# Patient Record
Sex: FEMALE | Race: BLACK OR AFRICAN AMERICAN | NOT HISPANIC OR LATINO | Employment: FULL TIME | RURAL
[De-identification: names, ages, dates, MRNs, and addresses within clinical notes are randomized per-mention and may not be internally consistent; named-entity substitution may affect disease eponyms.]

---

## 2018-05-17 ENCOUNTER — HISTORICAL (OUTPATIENT)
Dept: ADMINISTRATIVE | Facility: HOSPITAL | Age: 68
End: 2018-05-17

## 2018-05-21 LAB
LAB AP CLINICAL INFORMATION: NORMAL
LAB AP DIAGNOSIS - HISTORICAL: NORMAL
LAB AP GROSS PATHOLOGY - HISTORICAL: NORMAL
LAB AP SPECIMEN SUBMITTED - HISTORICAL: NORMAL

## 2018-10-04 ENCOUNTER — HISTORICAL (OUTPATIENT)
Dept: ADMINISTRATIVE | Facility: HOSPITAL | Age: 68
End: 2018-10-04

## 2020-07-05 ENCOUNTER — HISTORICAL (OUTPATIENT)
Dept: ADMINISTRATIVE | Facility: HOSPITAL | Age: 70
End: 2020-07-05

## 2020-07-05 LAB
ALBUMIN SERPL BCP-MCNC: 3.3 G/DL (ref 3.5–5)
ALBUMIN/GLOB SERPL: 0.6 {RATIO}
ALP SERPL-CCNC: 142 U/L (ref 55–142)
ALT SERPL W P-5'-P-CCNC: 23 U/L (ref 13–56)
AST SERPL W P-5'-P-CCNC: 11 U/L (ref 15–37)
BASOPHILS # BLD AUTO: 0.08 X10E3/UL (ref 0–0.2)
BASOPHILS NFR BLD AUTO: 0.5 % (ref 0–1)
BILIRUB SERPL-MCNC: 0.5 MG/DL (ref 0–1.2)
BUN SERPL-MCNC: 29 MG/DL (ref 7–18)
CALCIUM SERPL-MCNC: 9.7 MG/DL (ref 8.5–10.1)
CHLORIDE SERPL-SCNC: 101 MMOL/L (ref 98–107)
CO2 SERPL-SCNC: 27 MMOL/L (ref 21–32)
CREAT SERPL-MCNC: 1.97 MG/DL (ref 0.55–1.02)
EOSINOPHIL # BLD AUTO: 0 X10E3/UL (ref 0–0.5)
EOSINOPHIL NFR BLD AUTO: 0 % (ref 1–4)
ERYTHROCYTE [DISTWIDTH] IN BLOOD BY AUTOMATED COUNT: 12.4 % (ref 11.5–14.5)
GLOBULIN SER-MCNC: 5.7 G/DL (ref 2–4)
GLUCOSE SERPL-MCNC: 473 MG/DL (ref 74–106)
HCT VFR BLD AUTO: 35.3 % (ref 38–47)
HGB BLD-MCNC: 11.5 G/DL (ref 12–16)
LYMPHOCYTES # BLD AUTO: 2.16 X10E3/UL (ref 1–4.8)
LYMPHOCYTES NFR BLD AUTO: 13.4 % (ref 27–41)
MCH RBC QN AUTO: 26.6 PG (ref 27–31)
MCHC RBC AUTO-ENTMCNC: 32.6 G/DL (ref 32–36)
MCV RBC AUTO: 82 FL (ref 80–96)
MONOCYTES # BLD AUTO: 1.03 X10E3/UL (ref 0–0.8)
MONOCYTES NFR BLD AUTO: 6.4 % (ref 2–6)
MPC BLD CALC-MCNC: 10.9 FL (ref 9.4–12.4)
NEUTROPHILS # BLD AUTO: 12.87 X10E3/UL (ref 1.8–7.7)
NEUTROPHILS NFR BLD AUTO: 79.7 % (ref 53–65)
PLATELET # BLD AUTO: 468 X10E3/UL (ref 150–450)
POTASSIUM SERPL-SCNC: 5.2 MMOL/L (ref 3.5–5.1)
PROT SERPL-MCNC: 9 G/DL (ref 6.4–8.2)
RAPID GROUP A STREP ANTIGEN: POSITIVE
RBC # BLD AUTO: 4.32 X10E6/UL (ref 4.2–5.4)
SODIUM SERPL-SCNC: 138 MMOL/L (ref 136–145)
TROPONIN I SERPL-MCNC: <0.017 NG/ML (ref 0–0.06)
WBC # BLD AUTO: 16.14 X10E3/UL (ref 4.5–11)

## 2020-07-06 ENCOUNTER — HISTORICAL (OUTPATIENT)
Dept: ADMINISTRATIVE | Facility: HOSPITAL | Age: 70
End: 2020-07-06

## 2020-07-06 LAB
ALBUMIN SERPL BCP-MCNC: 3.2 G/DL (ref 3.5–5)
ALBUMIN/GLOB SERPL: 0.5 {RATIO}
ALP SERPL-CCNC: 137 U/L (ref 55–142)
ALT SERPL W P-5'-P-CCNC: 17 U/L (ref 13–56)
ANION GAP SERPL CALCULATED.3IONS-SCNC: 20 MMOL/L (ref 7–16)
AST SERPL W P-5'-P-CCNC: 25 U/L (ref 15–37)
BACTERIA #/AREA URNS HPF: ABNORMAL /HPF
BASOPHILS # BLD AUTO: 0.05 X10E3/UL (ref 0–0.2)
BASOPHILS NFR BLD AUTO: 0.5 % (ref 0–1)
BILIRUB SERPL-MCNC: 0.5 MG/DL (ref 0–1.2)
BILIRUB UR QL STRIP: ABNORMAL MG/DL
BUN SERPL-MCNC: 31 MG/DL (ref 7–18)
CALCIUM SERPL-MCNC: 9.4 MG/DL (ref 8.5–10.1)
CHLORIDE SERPL-SCNC: 97 MMOL/L (ref 98–107)
CLARITY UR: CLEAR
CLARITY UR: CLEAR
CO2 SERPL-SCNC: 23 MMOL/L (ref 21–32)
COLOR UR: YELLOW
COLOR UR: YELLOW
CREAT SERPL-MCNC: 1.77 MG/DL (ref 0.55–1.02)
EOSINOPHIL # BLD AUTO: 0.2 X10E3/UL (ref 0–0.5)
EOSINOPHIL NFR BLD AUTO: 2 % (ref 1–4)
ERYTHROCYTE [DISTWIDTH] IN BLOOD BY AUTOMATED COUNT: 12.5 % (ref 11.5–14.5)
GLOBULIN SER-MCNC: 5.9 G/DL (ref 2–4)
GLUCOSE SERPL-MCNC: 304 MG/DL (ref 70–105)
GLUCOSE SERPL-MCNC: 325 MG/DL (ref 70–105)
GLUCOSE SERPL-MCNC: 400 MG/DL (ref 74–106)
GLUCOSE UR STRIP-MCNC: ABNORMAL MG/DL
HCT VFR BLD AUTO: 37.3 % (ref 38–47)
HGB BLD-MCNC: 12.2 G/DL (ref 12–16)
KETONES UR STRIP-SCNC: ABNORMAL MG/DL
LACTATE SERPL-SCNC: 1.3 MMOL/L (ref 0.4–2)
LEUKOCYTE ESTERASE UR QL STRIP: NEGATIVE LEU/UL
LIPASE SERPL-CCNC: 198 U/L (ref 73–393)
LYMPHOCYTES # BLD AUTO: 2.28 X10E3/UL (ref 1–4.8)
LYMPHOCYTES NFR BLD AUTO: 22.7 % (ref 27–41)
MCH RBC QN AUTO: 26.5 PG (ref 27–31)
MCHC RBC AUTO-ENTMCNC: 32.7 G/DL (ref 32–36)
MCV RBC AUTO: 81 FL (ref 80–96)
MONOCYTES # BLD AUTO: 0.59 X10E3/UL (ref 0–0.8)
MONOCYTES NFR BLD AUTO: 5.9 % (ref 2–6)
MPC BLD CALC-MCNC: 11.6 FL (ref 9.4–12.4)
NEUTROPHILS # BLD AUTO: 6.92 X10E3/UL (ref 1.8–7.7)
NEUTROPHILS NFR BLD AUTO: 68.9 % (ref 53–65)
NITRITE UR QL STRIP: NEGATIVE
PH UR STRIP: 5 PH UNITS (ref 5–8)
PLATELET # BLD AUTO: 365 X10E3/UL (ref 150–450)
POTASSIUM SERPL-SCNC: 5.1 MMOL/L (ref 3.5–5.1)
PROT SERPL-MCNC: 9.1 G/DL (ref 6.4–8.2)
PROT UR QL STRIP: ABNORMAL MG/DL
RBC # BLD AUTO: 4.6 X10E6/UL (ref 4.2–5.4)
RBC # UR STRIP: NEGATIVE ERY/UL
RBC #/AREA URNS HPF: ABNORMAL /HPF (ref 0–3)
SARS-COV+SARS-COV-2 AG RESP QL IA.RAPID: NEGATIVE
SODIUM SERPL-SCNC: 135 MMOL/L (ref 136–145)
SP GR UR STRIP: 1.01 (ref 1–1.03)
TROPONIN I SERPL-MCNC: <0.017 NG/ML (ref 0–0.06)
UROBILINOGEN UR STRIP-ACNC: 0.2 MG/DL
WBC # BLD AUTO: 10.04 X10E3/UL (ref 4.5–11)
WBC #/AREA URNS HPF: ABNORMAL /HPF (ref 0–5)

## 2020-07-07 ENCOUNTER — HISTORICAL (OUTPATIENT)
Dept: ADMINISTRATIVE | Facility: HOSPITAL | Age: 70
End: 2020-07-07

## 2020-07-07 LAB
ANION GAP SERPL CALCULATED.3IONS-SCNC: 15 MMOL/L (ref 7–16)
BASOPHILS # BLD AUTO: 0.06 X10E3/UL (ref 0–0.2)
BASOPHILS NFR BLD AUTO: 0.7 % (ref 0–1)
BUN SERPL-MCNC: 27 MG/DL (ref 7–18)
CALCIUM SERPL-MCNC: 8.6 MG/DL (ref 8.5–10.1)
CHLORIDE SERPL-SCNC: 104 MMOL/L (ref 98–107)
CO2 SERPL-SCNC: 25 MMOL/L (ref 21–32)
CREAT SERPL-MCNC: 1.65 MG/DL (ref 0.55–1.02)
EOSINOPHIL # BLD AUTO: 0.13 X10E3/UL (ref 0–0.5)
EOSINOPHIL NFR BLD AUTO: 1.5 % (ref 1–4)
ERYTHROCYTE [DISTWIDTH] IN BLOOD BY AUTOMATED COUNT: 12.4 % (ref 11.5–14.5)
GLUCOSE SERPL-MCNC: 230 MG/DL (ref 74–106)
GLUCOSE SERPL-MCNC: 255 MG/DL (ref 70–105)
GLUCOSE SERPL-MCNC: 270 MG/DL (ref 70–105)
GLUCOSE SERPL-MCNC: 296 MG/DL (ref 70–105)
HCT VFR BLD AUTO: 35.4 % (ref 38–47)
HGB BLD-MCNC: 11.6 G/DL (ref 12–16)
LYMPHOCYTES # BLD AUTO: 3.46 X10E3/UL (ref 1–4.8)
LYMPHOCYTES NFR BLD AUTO: 39.5 % (ref 27–41)
MCH RBC QN AUTO: 26.9 PG (ref 27–31)
MCHC RBC AUTO-ENTMCNC: 32.8 G/DL (ref 32–36)
MCV RBC AUTO: 82 FL (ref 80–96)
MONOCYTES # BLD AUTO: 0.7 X10E3/UL (ref 0–0.8)
MONOCYTES NFR BLD AUTO: 8 % (ref 2–6)
MPC BLD CALC-MCNC: 10.5 FL (ref 9.4–12.4)
NEUTROPHILS # BLD AUTO: 4.42 X10E3/UL (ref 1.8–7.7)
NEUTROPHILS NFR BLD AUTO: 50.3 % (ref 53–65)
PLATELET # BLD AUTO: 462 X10E3/UL (ref 150–450)
POTASSIUM SERPL-SCNC: 3.8 MMOL/L (ref 3.5–5.1)
RBC # BLD AUTO: 4.32 X10E6/UL (ref 4.2–5.4)
SODIUM SERPL-SCNC: 140 MMOL/L (ref 136–145)
WBC # BLD AUTO: 8.77 X10E3/UL (ref 4.5–11)

## 2020-07-08 ENCOUNTER — HISTORICAL (OUTPATIENT)
Dept: ADMINISTRATIVE | Facility: HOSPITAL | Age: 70
End: 2020-07-08

## 2020-07-08 LAB
ANION GAP SERPL CALCULATED.3IONS-SCNC: 13 MMOL/L (ref 7–16)
BASOPHILS # BLD AUTO: 0.06 X10E3/UL (ref 0–0.2)
BASOPHILS NFR BLD AUTO: 0.8 % (ref 0–1)
BUN SERPL-MCNC: 17 MG/DL (ref 7–18)
CALCIUM SERPL-MCNC: 8.5 MG/DL (ref 8.5–10.1)
CHLORIDE SERPL-SCNC: 105 MMOL/L (ref 98–107)
CO2 SERPL-SCNC: 25 MMOL/L (ref 21–32)
CREAT SERPL-MCNC: 1.31 MG/DL (ref 0.55–1.02)
EOSINOPHIL # BLD AUTO: 0.24 X10E3/UL (ref 0–0.5)
EOSINOPHIL NFR BLD AUTO: 3.3 % (ref 1–4)
ERYTHROCYTE [DISTWIDTH] IN BLOOD BY AUTOMATED COUNT: 12.4 % (ref 11.5–14.5)
GLUCOSE SERPL-MCNC: 238 MG/DL (ref 70–105)
GLUCOSE SERPL-MCNC: 240 MG/DL (ref 74–106)
GLUCOSE SERPL-MCNC: 264 MG/DL (ref 70–105)
GLUCOSE SERPL-MCNC: 297 MG/DL (ref 70–105)
HCT VFR BLD AUTO: 34.5 % (ref 38–47)
HGB BLD-MCNC: 11.2 G/DL (ref 12–16)
LYMPHOCYTES # BLD AUTO: 3.2 X10E3/UL (ref 1–4.8)
LYMPHOCYTES NFR BLD AUTO: 44.5 % (ref 27–41)
MCH RBC QN AUTO: 26.7 PG (ref 27–31)
MCHC RBC AUTO-ENTMCNC: 32.5 G/DL (ref 32–36)
MCV RBC AUTO: 82 FL (ref 80–96)
MONOCYTES # BLD AUTO: 0.57 X10E3/UL (ref 0–0.8)
MONOCYTES NFR BLD AUTO: 7.9 % (ref 2–6)
MPC BLD CALC-MCNC: 10.5 FL (ref 9.4–12.4)
NEUTROPHILS # BLD AUTO: 3.12 X10E3/UL (ref 1.8–7.7)
NEUTROPHILS NFR BLD AUTO: 43.5 % (ref 53–65)
PLATELET # BLD AUTO: 419 X10E3/UL (ref 150–450)
POTASSIUM SERPL-SCNC: 3.8 MMOL/L (ref 3.5–5.1)
RBC # BLD AUTO: 4.2 X10E6/UL (ref 4.2–5.4)
SODIUM SERPL-SCNC: 139 MMOL/L (ref 136–145)
WBC # BLD AUTO: 7.19 X10E3/UL (ref 4.5–11)

## 2020-07-09 LAB — C DIFF TOX A+B STL IA-ACNC: NEGATIVE

## 2021-02-07 ENCOUNTER — HISTORICAL (OUTPATIENT)
Dept: ADMINISTRATIVE | Facility: HOSPITAL | Age: 71
End: 2021-02-07

## 2021-02-07 LAB
ALBUMIN SERPL BCP-MCNC: 3.4 G/DL (ref 3.5–5)
ALBUMIN/GLOB SERPL: 0.7 {RATIO}
ALP SERPL-CCNC: 119 U/L (ref 55–142)
ALT SERPL W P-5'-P-CCNC: 19 U/L (ref 13–56)
ANION GAP SERPL CALCULATED.3IONS-SCNC: 12 MMOL/L (ref 7–16)
AST SERPL W P-5'-P-CCNC: 14 U/L (ref 15–37)
BACTERIA #/AREA URNS HPF: ABNORMAL /HPF
BASOPHILS # BLD AUTO: 0.06 X10E3/UL (ref 0–0.2)
BASOPHILS NFR BLD AUTO: 0.6 % (ref 0–1)
BILIRUB SERPL-MCNC: 0.5 MG/DL (ref 0–1.2)
BILIRUB UR QL STRIP: ABNORMAL MG/DL
BUN SERPL-MCNC: 16 MG/DL (ref 7–18)
CALCIUM SERPL-MCNC: 9.3 MG/DL (ref 8.5–10.1)
CHLORIDE SERPL-SCNC: 103 MMOL/L (ref 98–107)
CLARITY UR: CLEAR
CLARITY UR: CLEAR
CO2 SERPL-SCNC: 28 MMOL/L (ref 21–32)
COLOR UR: ABNORMAL
COLOR UR: ABNORMAL
CREAT SERPL-MCNC: 1.5 MG/DL (ref 0.55–1.02)
EOSINOPHIL # BLD AUTO: 0.02 X10E3/UL (ref 0–0.5)
EOSINOPHIL NFR BLD AUTO: 0.2 % (ref 1–4)
ERYTHROCYTE [DISTWIDTH] IN BLOOD BY AUTOMATED COUNT: 13.8 % (ref 11.5–14.5)
FLUAV AG UPPER RESP QL IA.RAPID: NEGATIVE
FLUBV AG UPPER RESP QL IA.RAPID: NEGATIVE
GLOBULIN SER-MCNC: 5.2 G/DL (ref 2–4)
GLUCOSE SERPL-MCNC: 268 MG/DL (ref 74–106)
GLUCOSE UR STRIP-MCNC: ABNORMAL MG/DL
HCT VFR BLD AUTO: 37 % (ref 38–47)
HGB BLD-MCNC: 12.1 G/DL (ref 12–16)
KETONES UR STRIP-SCNC: ABNORMAL MG/DL
LACTATE SERPL-SCNC: 1.3 MMOL/L (ref 0.4–2)
LEUKOCYTE ESTERASE UR QL STRIP: NEGATIVE LEU/UL
LIPASE SERPL-CCNC: 140 U/L (ref 73–393)
LYMPHOCYTES # BLD AUTO: 2.11 X10E3/UL (ref 1–4.8)
LYMPHOCYTES NFR BLD AUTO: 20.3 % (ref 27–41)
MCH RBC QN AUTO: 25.2 PG (ref 27–31)
MCHC RBC AUTO-ENTMCNC: 32.7 G/DL (ref 32–36)
MCV RBC AUTO: 77 FL (ref 80–96)
MONOCYTES # BLD AUTO: 0.55 X10E3/UL (ref 0–0.8)
MONOCYTES NFR BLD AUTO: 5.3 % (ref 2–6)
MPC BLD CALC-MCNC: 10.7 FL (ref 9.4–12.4)
NEUTROPHILS # BLD AUTO: 7.67 X10E3/UL (ref 1.8–7.7)
NEUTROPHILS NFR BLD AUTO: 73.6 % (ref 53–65)
NITRITE UR QL STRIP: NEGATIVE
PH UR STRIP: 5.5 PH UNITS (ref 5–8)
PLATELET # BLD AUTO: 362 X10E3/UL (ref 150–450)
POTASSIUM SERPL-SCNC: 4 MMOL/L (ref 3.5–5.1)
PROT SERPL-MCNC: 8.6 G/DL (ref 6.4–8.2)
PROT UR QL STRIP: 30 MG/DL
RBC # BLD AUTO: 4.8 X10E6/UL (ref 4.2–5.4)
RBC # UR STRIP: ABNORMAL ERY/UL
RBC #/AREA URNS HPF: ABNORMAL /HPF (ref 0–3)
SARS-COV+SARS-COV-2 AG RESP QL IA.RAPID: NEGATIVE
SODIUM SERPL-SCNC: 139 MMOL/L (ref 136–145)
SP GR UR STRIP: 1.03 (ref 1–1.03)
SQUAMOUS #/AREA URNS LPF: ABNORMAL /LPF
UROBILINOGEN UR STRIP-ACNC: 0.2 MG/DL
WBC # BLD AUTO: 10.41 X10E3/UL (ref 4.5–11)
WBC #/AREA URNS HPF: ABNORMAL /HPF (ref 0–5)

## 2021-04-07 ENCOUNTER — HISTORICAL (OUTPATIENT)
Dept: ADMINISTRATIVE | Facility: HOSPITAL | Age: 71
End: 2021-04-07

## 2021-04-28 ENCOUNTER — HISTORICAL (OUTPATIENT)
Dept: ADMINISTRATIVE | Facility: HOSPITAL | Age: 71
End: 2021-04-28

## 2021-07-13 LAB — CRC RECOMMENDATION EXT: NORMAL

## 2021-10-25 DIAGNOSIS — M47.817 LUMBOSACRAL SPONDYLOSIS WITHOUT MYELOPATHY: Primary | ICD-10-CM

## 2021-10-25 DIAGNOSIS — M51.27 LUMBAGO-SCIATICA DUE TO DISPLACEMENT OF LUMBAR INTERVERTEBRAL DISC: ICD-10-CM

## 2021-10-25 DIAGNOSIS — M53.3 DISORDER OF SACRUM: ICD-10-CM

## 2021-10-25 DIAGNOSIS — M54.16 LUMBAR RADICULOPATHY: ICD-10-CM

## 2021-10-29 DIAGNOSIS — M54.16 LUMBAR RADICULOPATHY: ICD-10-CM

## 2021-10-29 DIAGNOSIS — M51.27 LUMBAGO-SCIATICA DUE TO DISPLACEMENT OF LUMBAR INTERVERTEBRAL DISC: ICD-10-CM

## 2021-10-29 DIAGNOSIS — M47.817 LUMBOSACRAL SPONDYLOSIS WITHOUT MYELOPATHY: Primary | ICD-10-CM

## 2021-10-29 DIAGNOSIS — M53.3 DISORDER OF SACRUM: ICD-10-CM

## 2021-11-02 ENCOUNTER — CLINICAL SUPPORT (OUTPATIENT)
Dept: REHABILITATION | Facility: HOSPITAL | Age: 71
End: 2021-11-02
Payer: COMMERCIAL

## 2021-11-02 DIAGNOSIS — M51.27 LUMBAGO-SCIATICA DUE TO DISPLACEMENT OF LUMBAR INTERVERTEBRAL DISC: ICD-10-CM

## 2021-11-02 DIAGNOSIS — M47.817 LUMBOSACRAL SPONDYLOSIS WITHOUT MYELOPATHY: ICD-10-CM

## 2021-11-02 DIAGNOSIS — M54.16 LUMBAR RADICULOPATHY: ICD-10-CM

## 2021-11-02 PROCEDURE — 97163 PT EVAL HIGH COMPLEX 45 MIN: CPT

## 2022-01-11 DIAGNOSIS — M51.36 DEGENERATION OF LUMBAR INTERVERTEBRAL DISC: Primary | ICD-10-CM

## 2022-01-13 ENCOUNTER — CLINICAL SUPPORT (OUTPATIENT)
Dept: REHABILITATION | Facility: HOSPITAL | Age: 72
End: 2022-01-13
Payer: COMMERCIAL

## 2022-01-13 DIAGNOSIS — M51.36 DEGENERATION OF LUMBAR INTERVERTEBRAL DISC: Primary | ICD-10-CM

## 2022-01-13 PROBLEM — M51.369 DEGENERATION OF LUMBAR INTERVERTEBRAL DISC: Status: ACTIVE | Noted: 2022-01-13

## 2022-01-13 PROCEDURE — 97163 PT EVAL HIGH COMPLEX 45 MIN: CPT

## 2022-01-13 NOTE — PLAN OF CARE
[facility]   Physical Therapy Initial Evaluation    Name: Marianna Neal  Clinic Number: [id number]    Therapy Diagnosis:   Encounter Diagnosis   Name Primary?    Degeneration of lumbar intervertebral disc Yes     Physician: Raiza Sorto MD  Physician Orders: PT Eval and Treat     Evaluation Date: 1/13/2022      Visit # / Visits authorized:12      Subjective     History of current condition - Marianna reports: patient reports having low back pain for months that is gradually getting worse.  She states her pain is in her left hip and buttocks.     Medical History:   OA, Back pain, BMI >30, Diabetes, Headaches, HTN,     Surgical History:   Marianna Neal  has no past surgical history on file.    Medications:   Marianna currently has no medications in their medication list.    Allergies:   Review of patient's allergies indicates:  Not on File       Prior Therapy: N/A for low back pain  Social History:  lives with their family  Occupation: works at local [employer]  Prior Level of Function: Independent  Current Level of Function: See FOTO tool scanned in [facility]    Pain:  Current 8/10, worst 10/10, best 7/10   Location: Left hip and buttocks  Description: Aching and Deep  Aggravating Factors: Standing, Bending and Walking  Easing Factors: rest    Pts goals: I want my back to get better    Objective       Forward bend: limited  Back bend: limited      MANUAL MUSCLE TEST  Right left   Hip flexion MMT number: 4-/5 MMT strength: 3+/5   Hip abduction MMT strength: 3/5 MMT strength: 2/5   Knee extension MMT strength: 4/5 MMT strength: 4/5   Knee flexion  MMT strength: 4/5 MMT strength: 4/5   Ankle dorsiflexion MMT strength: 4+/5 MMT strength: 4/5   Ankle plantar flexion  MMT strength: 4+/5 MMT strength: 4/5   situp Unable to perform  2/5      ROM/flexibility right left   Hip flexion (120) WFL WFL   Internal rotation (45) WFL WFL   External rotation (45) WFL WFL   Hamstring 90/90 (-10) -30 -35                     Special test Right  Left    SLR test < 60 degrees Negative Negative   SLR test > 60 degrees Negative Positive        Piriformis test Negative Positive   ANNIE test Negative Positive                    Gait assessment:   Increased trunk lean to left, Independent no assitive device,     Other test/information:    Bed Mobility:  Difficulty and extra time required with roll right and left  Sit<>stand:  req's UE assist  Stairs: requires use of railing, reciprocal pattern    Palpation: point tender left hip          Limitation/Restriction for FOTO  Survey    Therapist reviewed FOTO scores for Marianna Neal on 1/13/2022.   FOTO documents entered into [employer] - see Media section.    Limitation Score: 42/100%         TREATMENT     Marianna received the treatments listed below:  Eval only for precert.    Home Exercises and Patient Education Provided    Education provided:   - Next session      Assessment   Marianna is a 71 y.o. female referred to outpatient Physical Therapy with a medical diagnosis of DDD. Pt presents with weakness, pain, gait disorder and difficulty with transfers.    Pt prognosis is Good.   Pt will benefit from skilled outpatient Physical Therapy to address the deficits stated above and in the chart below, provide pt/family education, and to maximize pt's level of independence.     Plan of care discussed with patient: Yes  Pt's spiritual, cultural and educational needs considered and patient is agreeable to the plan of care and goals as stated below:     Anticipated Barriers for therapy: None      Goals:  Short Term Goals: 2 weeks   1) I HOME EXERCISE PROGRAM  2) decrease pain to 6/10  3) patient tolerate 40 min of activity    Long Term Goals: 4 weeks   1) Functional limitation 59/100%  2) Decrease pain to 4/10  3) Normalize gait  4) Normalize flexibility    Plan   Plan of care Certification: 1/13/2022 to 2/22/2022.    Outpatient Physical Therapy 3 times weekly for 4 weeks to include the following  interventions: Electrical Stimulation IFC, Gait Training, Moist Heat/ Ice, Therapeutic Activities and Therapeutic Exercise.     Markus Bell PT       Physician:__________________________________________________________      Date:______________________________________________________________

## 2022-01-19 ENCOUNTER — CLINICAL SUPPORT (OUTPATIENT)
Dept: REHABILITATION | Facility: HOSPITAL | Age: 72
End: 2022-01-19
Payer: COMMERCIAL

## 2022-01-19 DIAGNOSIS — M54.16 LUMBAR RADICULOPATHY: ICD-10-CM

## 2022-01-19 DIAGNOSIS — M51.36 DEGENERATION OF LUMBAR INTERVERTEBRAL DISC: Primary | ICD-10-CM

## 2022-01-19 PROCEDURE — 97110 THERAPEUTIC EXERCISES: CPT | Mod: CQ

## 2022-01-19 PROCEDURE — 97014 ELECTRIC STIMULATION THERAPY: CPT | Mod: CQ

## 2022-01-19 NOTE — PROGRESS NOTES
Physical Therapy Treatment Note         Name: Marianna Neal    Clinic Number: [id number]         Therapy Diagnosis:   Encounter Diagnoses   Name Primary?    Degeneration of lumbar intervertebral disc Yes    Lumbar radiculopathy        Physician: Raiza Perez MD         Visit Date: 1/19/2022         Physician Orders: PT eval and treat          Visit #/Visits authorized: 2 / 12              Precautions: none significant         Subjective         Pt reports: I had to miss my appt yesterday dt transportation issues.    She was compliant with home exercise program: NA; HEP given today.    Response to previous treatment: NA    Functional change: none significant         Pain: 9/10    Location: across low back and L hip/buttock          Objective         Marianna received therapeutic exercises to develop strength, flexibility and core stabilization for 30 minutes including:    Exercises Day 2 Day 3 Day 4 Day 5  Day 6 Day 7  (Sup Visit) Day 8 Day 9 Day 10  Day 11 Day 12   alma rosa hamstring stretch [id number]             alma rosa piriformis stretch 7q41aimi             alma rosa lumbar rotation [id number]                                                                                                                              After being cleared for contradictions and prior to therex, Marianna received IFC Electrical Stimulation along with MHP for pain control applied to the bilateral lumbar paraspinals and posterior hips  for 15 minutes. Marianna tolerated treatment well without any adverse effects.                      Home Exercises Provided and Patient Education Provided         Education provided:    - proper sit<>supine technique to avoid excessive back strain.         Written Home Exercises Provided: yes.    Exercises were reviewed and Marianna was able to demonstrate them prior to the end of the session.  Marianna demonstrated good  understanding of the education provided.         See EMR under Media for exercises provided  prior visit.         Assessment             Marianna Is progressing well towards her goals.    Pt prognosis is Good.         Pt will continue to benefit from skilled outpatient physical therapy to address the deficits listed in the problem list box on initial evaluation, provide pt/family education and to maximize pt's level of independence in the home and community environment.         Pt's spiritual, cultural and educational needs considered and pt agreeable to plan of care and goals.      Anticipated barriers to physical therapy: none significant.      Evaluation Date: 1/13/22      Goals:     Goals:  Short Term Goals: 2 weeks   1) I HOME EXERCISE PROGRAM  2) decrease pain to 6/10  3) patient tolerate 40 min of activity     Long Term Goals: 4 weeks   1) Functional limitation 59/100%  2) Decrease pain to 4/10  3) Normalize gait  4) Normalize flexibility         Plan         Outpatient Physical Therapy 3 times weekly for 4 weeks to include the following interventions: Electrical Stimulation IFC, Gait Training, Moist Heat/ Ice, Therapeutic Activities and Therapeutic Exercise.             Rena Rubio, PTA

## 2022-01-20 ENCOUNTER — CLINICAL SUPPORT (OUTPATIENT)
Dept: REHABILITATION | Facility: HOSPITAL | Age: 72
End: 2022-01-20
Payer: COMMERCIAL

## 2022-01-20 DIAGNOSIS — M51.36 DEGENERATION OF LUMBAR INTERVERTEBRAL DISC: Primary | ICD-10-CM

## 2022-01-20 DIAGNOSIS — M54.16 LUMBAR RADICULOPATHY: ICD-10-CM

## 2022-01-20 PROCEDURE — 97014 ELECTRIC STIMULATION THERAPY: CPT | Mod: CQ

## 2022-01-20 PROCEDURE — 97110 THERAPEUTIC EXERCISES: CPT | Mod: CQ

## 2022-01-20 NOTE — PROGRESS NOTES
Physical Therapy Treatment Note         Name: Marianna Neal    Clinic Number: [id number]         Therapy Diagnosis:   Encounter Diagnoses   Name Primary?    Degeneration of lumbar intervertebral disc Yes    Lumbar radiculopathy        Physician: Raiza Perez MD         Visit Date: 1/20/2022         Physician Orders: PT eval and treat          Visit #/Visits authorized: 3 / 12              Precautions: none significant         Subjective         Pt reports:     She was compliant with home exercise program.    Response to previous treatment: moderate soreness after completing stretches yesterday. The heat and IFC help my pain.    Functional change: none significant.         Pain: 8/10    Location: across low back and L hip/buttock          Objective         Marianna received therapeutic exercises to develop strength, flexibility and core stabilization for 28 minutes including:    Exercises Day 2 Day 3 Day 4 Day 5  Day 6 Day 7  (Sup Visit) Day 8 Day 9 Day 10  Day 11 Day 12   alma rosa hamstring stretch 2q32mapq 2n44rfdf            alma rosa piriformis stretch 3w56hxvd 8h29vbpb            alma rosa lumbar rotation 17o64glij 13d91eori                                                                                                                VC's for correct therex technique.             After being cleared for contradictions and prior to therex, Marianna received IFC Electrical Stimulation along with MHP for pain control applied to the bilateral lumbar paraspinals and posterior hips  for 15 minutes. Marianna tolerated treatment well without any adverse effects.                      Home Exercises Provided and Patient Education Provided         Education provided:    - proper sit<>supine technique to avoid back strain with position changes.         Written Home Exercises Provided: Patient instructed to cont prior HEP.           Assessment             Marianna Is progressing well towards her goals.    Pt prognosis is Good.          Pt will continue to benefit from skilled outpatient physical therapy to address the deficits listed in the problem list box on initial evaluation, provide pt/family education and to maximize pt's level of independence in the home and community environment.         Pt's spiritual, cultural and educational needs considered and pt agreeable to plan of care and goals.      Anticipated barriers to physical therapy: none significant.      Evaluation Date: 1/13/22      Goals:     Goals:  Short Term Goals: 2 weeks   1) I HOME EXERCISE PROGRAM  2) decrease pain to 6/10  3) patient tolerate 40 min of activity     Long Term Goals: 4 weeks   1) Functional limitation 59/100%  2) Decrease pain to 4/10  3) Normalize gait  4) Normalize flexibility         Plan         Outpatient Physical Therapy 3 times weekly for 4 weeks to include the following interventions: Electrical Stimulation IFC, Gait Training, Moist Heat/ Ice, Therapeutic Activities and Therapeutic Exercise.             Rena Rubio, PTA

## 2022-01-21 ENCOUNTER — CLINICAL SUPPORT (OUTPATIENT)
Dept: REHABILITATION | Facility: HOSPITAL | Age: 72
End: 2022-01-21
Payer: COMMERCIAL

## 2022-01-21 DIAGNOSIS — M51.36 DEGENERATION OF LUMBAR INTERVERTEBRAL DISC: Primary | ICD-10-CM

## 2022-01-21 DIAGNOSIS — M54.16 LUMBAR RADICULOPATHY: ICD-10-CM

## 2022-01-21 PROCEDURE — 97014 ELECTRIC STIMULATION THERAPY: CPT | Mod: CQ

## 2022-01-21 PROCEDURE — 97110 THERAPEUTIC EXERCISES: CPT | Mod: CQ

## 2022-01-21 NOTE — PROGRESS NOTES
Physical Therapy Treatment Note         Name: Marianna Neal    Clinic Number: [id number]         Therapy Diagnosis:   Encounter Diagnoses   Name Primary?    Degeneration of lumbar intervertebral disc Yes    Lumbar radiculopathy        Physician: Raiza Perez MD         Visit Date: 1/21/2022         Physician Orders: PT eval and treat          Visit #/Visits authorized: 4 / 12              Precautions: none significant         Subjective         Pt reports:     She was compliant with home exercise program.    Response to previous treatment: Minimal soreness from stretches. The heat and IFC help my pain.    Functional change: overall pain is improving but my L hip feels weak.         Pain: 7/10    Location: across low back and L hip/buttock          Objective         Marianna received therapeutic exercises to develop strength, flexibility and core stabilization for 26 minutes including:    Exercises Day 2 Day 3 Day 4 Day 5  Day 6 Day 7  (Sup Visit) Day 8 Day 9 Day 10  Day 11 Day 12   alma rosa hamstring stretch 0h69otzq 4g01ndsv 2o45tplw           alma rosa piriformis stretch 4u86eftx 4x35xhjo 3c63brgu           alma rosa lumbar rotation 19s39sizi 89v97coam 74z92jxnk                                                                                                               VC's for correct therex technique.             After being cleared for contradictions and prior to therex, Marianna received IFC Electrical Stimulation along with MHP for pain control applied to the bilateral lumbar paraspinals and posterior hips  for 15 minutes. Marianna tolerated treatment well without any adverse effects.                      Home Exercises Provided and Patient Education Provided         Education provided:    - proper sit<>supine technique to avoid back strain with position changes.         Written Home Exercises Provided: Patient instructed to cont prior HEP.           Assessment             Marianna Is progressing well towards her  goals.    Pt prognosis is Good.         Pt will continue to benefit from skilled outpatient physical therapy to address the deficits listed in the problem list box on initial evaluation, provide pt/family education and to maximize pt's level of independence in the home and community environment.         Pt's spiritual, cultural and educational needs considered and pt agreeable to plan of care and goals.      Anticipated barriers to physical therapy: none significant.      Evaluation Date: 1/13/22      Goals:     Goals:  Short Term Goals: 2 weeks   1) I HOME EXERCISE PROGRAM  2) decrease pain to 6/10  3) patient tolerate 40 min of activity     Long Term Goals: 4 weeks   1) Functional limitation 59/100%  2) Decrease pain to 4/10  3) Normalize gait  4) Normalize flexibility         Plan    Increase therex to include strengthening of LE's/hips.       Outpatient Physical Therapy 3 times weekly for 4 weeks to include the following interventions: Electrical Stimulation IFC, Gait Training, Moist Heat/ Ice, Therapeutic Activities and Therapeutic Exercise.             Rena Rubio, PTA

## 2022-01-24 ENCOUNTER — CLINICAL SUPPORT (OUTPATIENT)
Dept: REHABILITATION | Facility: HOSPITAL | Age: 72
End: 2022-01-24
Payer: COMMERCIAL

## 2022-01-24 DIAGNOSIS — M54.16 LUMBAR RADICULOPATHY: Primary | ICD-10-CM

## 2022-01-24 PROCEDURE — 97110 THERAPEUTIC EXERCISES: CPT | Mod: CQ

## 2022-01-24 PROCEDURE — 97014 ELECTRIC STIMULATION THERAPY: CPT | Mod: CQ

## 2022-01-24 NOTE — PROGRESS NOTES
Physical Therapy Treatment Note         Name: Marianna Neal    Clinic Number: [id number]         Therapy Diagnosis:   Encounter Diagnosis   Name Primary?    Lumbar radiculopathy Yes       Physician: Raiza Perez MD         Visit Date: 1/24/2022         Physician Orders: PT eval and treat          Visit #/Visits authorized: 5 / 12              Precautions: none significant         Subjective         Pt reports:  I am still slow to start walking when I've been sitting a while because of my L hip pain and weakness.    She was compliant with home exercise program.    Response to previous treatment: The heat and IFC help my pain.             Pain: 7/10    Location: across low back and L hip/buttock          Objective         Marianna received therapeutic exercises to develop strength, flexibility and core stabilization for 31 minutes including:    Exercises Day 2 Day 3 Day 4 Day 5  Day 6 Day 7  (Sup Visit) Day 8 Day 9 Day 10  Day 11 Day 12   alma rosa hamstring stretch 8i51syup 5e96hxja 1a29xobg 7i38xcgj          alma rosa piriformis stretch 2j36wdoo 3x57yucq 1n77zpsq 6u11clgd          alma rosa lumbar rotation 35v05xxtb 57a04veey 21l77ijtk 53q43hcak          alma rosa SL quad stretch    4g80decp                                                                                                Initiated quad stretches with manual and VC's for correct therex technique.             After being cleared for contradictions and prior to therex, Marianna received IFC Electrical Stimulation along with MHP for pain control applied to the bilateral lumbar paraspinals and posterior hips  for 15 minutes. Marianna tolerated treatment well without any adverse effects.                      Education provided:    - postural awareness.         Written Home Exercises Provided: Patient instructed to cont prior HEP.           Assessment             Marianna Is progressing well towards her goals.    Pt prognosis is Good.         Pt will continue to benefit from  skilled outpatient physical therapy to address the deficits listed in the problem list box on initial evaluation, provide pt/family education and to maximize pt's level of independence in the home and community environment.         Pt's spiritual, cultural and educational needs considered and pt agreeable to plan of care and goals.      Anticipated barriers to physical therapy: none significant.      Evaluation Date: 1/13/22      Goals:     Goals:  Short Term Goals: 2 weeks   1) I HOME EXERCISE PROGRAM  2) decrease pain to 6/10  3) patient tolerate 40 min of activity     Long Term Goals: 4 weeks   1) Functional limitation 59/100%  2) Decrease pain to 4/10  3) Normalize gait  4) Normalize flexibility         Plan    Increase therex to include strengthening of LE's/hips.       Outpatient Physical Therapy 3 times weekly for 4 weeks to include the following interventions: Electrical Stimulation IFC, Gait Training, Moist Heat/ Ice, Therapeutic Activities and Therapeutic Exercise.             Rena Rubio, PTA

## 2022-01-25 ENCOUNTER — CLINICAL SUPPORT (OUTPATIENT)
Dept: REHABILITATION | Facility: HOSPITAL | Age: 72
End: 2022-01-25
Payer: COMMERCIAL

## 2022-01-25 DIAGNOSIS — M54.16 LUMBAR RADICULOPATHY: Primary | ICD-10-CM

## 2022-01-25 PROCEDURE — 97014 ELECTRIC STIMULATION THERAPY: CPT | Mod: KX,CQ

## 2022-01-25 PROCEDURE — 97110 THERAPEUTIC EXERCISES: CPT | Mod: KX,CQ

## 2022-01-25 NOTE — PROGRESS NOTES
Physical Therapy Treatment Note         Name: Marianna Flex    Clinic Number: [id number]         Therapy Diagnosis:   Encounter Diagnosis   Name Primary?    Lumbar radiculopathy Yes       Physician: Raiza Perez MD         Visit Date: 1/25/2022         Physician Orders: PT eval and treat          Visit #/Visits authorized: 6 / 12              Precautions: none significant         Subjective         Pt reports: The pain across my low back is improving; it's mostly my L hip now that is bothering me.       She was compliant with home exercise program.    Response to previous treatment: The heat and IFC help my pain.             Pain: 6/10    Location: across low back and L hip/buttock          Objective         Marianna received therapeutic exercises to develop strength, flexibility and core stabilization for 35 minutes including:    Exercises Day 2 Day 3 Day 4 Day 5  Day 6 Day 7  (Sup Visit) Day 8 Day 9 Day 10  Day 11 Day 12   alma rosa hamstring stretch 8m97uktb 1b95uqad 4g30bmmi 6g23zlzy 2w99stqg         alma rosa piriformis stretch 9h45kjej 1t94iflz 9o02vpiu 0n37jbuo 5s83ymbf         alma rosa lumbar rotation 54f21goaq 57a20lfgd 03s87vuxw 87s64heur 03h97xwij         alma rosa SL quad stretch    3e50mbse 0w98puix         alma rosa clamshells     1x5jqcz                                                                                 Initiated clam shells with manual and VC's for correct therex technique for gentle hip strength building.             After being cleared for contradictions and prior to therex, Marianna received IFC Electrical Stimulation along with MHP for pain control applied to the bilateral lumbar paraspinals and posterior hips  for 15 minutes. Marianna tolerated treatment well without any adverse effects.                      Education provided:    - postural awareness.         Written Home Exercises Provided: Patient instructed to cont prior HEP.           Assessment             Marianna Is progressing well towards her  goals. Good progress with overall pain level.    Pt prognosis is Good.         Pt will continue to benefit from skilled outpatient physical therapy to address the deficits listed in the problem list box on initial evaluation, provide pt/family education and to maximize pt's level of independence in the home and community environment.         Pt's spiritual, cultural and educational needs considered and pt agreeable to plan of care and goals.      Anticipated barriers to physical therapy: none significant.      Evaluation Date: 1/13/22      Goals:     Goals:  Short Term Goals: 2 weeks   1) I HOME EXERCISE PROGRAM  2) decrease pain to 6/10  3) patient tolerate 40 min of activity     Long Term Goals: 4 weeks   1) Functional limitation 59/100%  2) Decrease pain to 4/10  3) Normalize gait  4) Normalize flexibility         Plan         Outpatient Physical Therapy 3 times weekly for 4 weeks to include the following interventions: Electrical Stimulation IFC, Gait Training, Moist Heat/ Ice, Therapeutic Activities and Therapeutic Exercise.             Rena Rubio, PTA

## 2022-01-26 ENCOUNTER — CLINICAL SUPPORT (OUTPATIENT)
Dept: REHABILITATION | Facility: HOSPITAL | Age: 72
End: 2022-01-26
Payer: COMMERCIAL

## 2022-01-26 PROCEDURE — 97110 THERAPEUTIC EXERCISES: CPT

## 2022-01-26 PROCEDURE — 97010 HOT OR COLD PACKS THERAPY: CPT

## 2022-01-26 PROCEDURE — 97014 ELECTRIC STIMULATION THERAPY: CPT

## 2022-01-26 NOTE — PROGRESS NOTES
Physical Therapy Treatment Note         Name: Marianna Flex    Clinic Number: [id number]    Physician: Raiza Perez MD       Visit Date: 1/26/2022       Physician Orders: PT eval and treat        Visit #/Visits authorized: 7 / 12              Precautions: none significant         Subjective         Pt reports: The pain across my low back is improving; it's mostly my L hip now that is bothering me.       She was compliant with home exercise program.    Response to previous treatment: The heat and IFC help my pain.             Pain: 8/10    Location: across low back and L hip/buttock , she attributes rise in pain to drop in weather this AM.         Objective         Marianna received therapeutic exercises to develop strength, flexibility and core stabilization for 35 minutes including:    Exercises Day 2 Day 3 Day 4 Day 5  Day 6 Day 7  (Sup Visit) Day 8 Day 9 Day 10  Day 11 Day 12   alma rosa hamstring stretch 0f31ovzh 5s81lykd 4i21ofpe 2g89ojps 4w91ykyb 2z11like        alma rosa piriformis stretch 5t35tlcr 1c88fisu 7j32bdbk 9f72bxie 9b46uasq 7p59pglf        alma rosa lumbar rotation 86l20zdbq 00d81nucw 14k13ragn 15m14bstc 83y16qtpn 33k30nyjr        alma rosa SL quad stretch    0s63hsjd 5k03hawt 1r47anln        alma rosa clamshells     0y6qczg 2k4avbx                                                                                    After being cleared for contradictions and prior to therex, Marianna received IFC Electrical Stimulation along with MHP for pain control applied to the bilateral lumbar paraspinals and posterior hips  for 15 minutes. Marianna tolerated treatment well without any adverse effects.          Education provided:    - postural awareness.     Written Home Exercises Provided: Patient instructed to cont prior HEP.        Assessment    Supervisory visit with Rena Rubio PTA.  Plan of care reviewed and deemed appropriate discussed plan with patient and patient agreed plan was appropriate.    FOTO TOOL completed and  scanned into [employer].      Marianna Is progressing well towards her goals. Good progress with overall pain level despite sudden spike today.    Pt prognosis is Good.       Pt will continue to benefit from skilled outpatient physical therapy to address the deficits listed in the problem list box on initial evaluation, provide pt/family education and to maximize pt's level of independence in the home and community environment.       Pt's spiritual, cultural and educational needs considered and pt agreeable to plan of care and goals.    Anticipated barriers to physical therapy: none significant.      Evaluation Date: 1/13/22      Goals:     Goals:  Short Term Goals: 2 weeks   1) I HOME EXERCISE PROGRAM (MET)  2) decrease pain to 6/10 (progressing)  3) patient tolerate 40 min of activity (progressing)     Long Term Goals: 4 weeks   1) Functional limitation 59/100% (progressing)  2) Decrease pain to 4/10 (progressing)  3) Normalize gait (progressing)  4) Normalize flexibility (progressing)         Plan         Outpatient Physical Therapy 3 times weekly for 4 weeks to include the following interventions: Electrical Stimulation IFC, Gait Training, Moist Heat/ Ice, Therapeutic Activities and Therapeutic Exercise.             Markus Bell, PT

## 2022-01-31 ENCOUNTER — CLINICAL SUPPORT (OUTPATIENT)
Dept: REHABILITATION | Facility: HOSPITAL | Age: 72
End: 2022-01-31
Payer: COMMERCIAL

## 2022-01-31 DIAGNOSIS — M51.36 DEGENERATION OF LUMBAR INTERVERTEBRAL DISC: ICD-10-CM

## 2022-01-31 DIAGNOSIS — M54.16 LUMBAR RADICULOPATHY: Primary | ICD-10-CM

## 2022-01-31 PROCEDURE — 97110 THERAPEUTIC EXERCISES: CPT | Mod: KX,CQ

## 2022-01-31 PROCEDURE — 97014 ELECTRIC STIMULATION THERAPY: CPT | Mod: KX,CQ

## 2022-01-31 NOTE — PROGRESS NOTES
Physical Therapy Treatment Note         Name: Marianna Flex    Clinic Number: [id number]    Physician: Orion Cummings, *       Visit Date: 1/31/2022       Physician Orders: PT eval and treat        Visit #/Visits authorized: 8 / 12              Precautions: none significant         Subjective         Pt reports: My L hip pain was bad yesterday, but good bit better today.       She was compliant with home exercise program.    Response to previous treatment: The heat and IFC help my pain.             Pain: 6/10    Location: across low back and L hip/buttock.         Objective         Marianna received therapeutic exercises to develop strength, flexibility and core stabilization for 38 minutes including:    Exercises Day 2 Day 3 Day 4 Day 5  Day 6 Day 7  (Sup Visit) Day 8 Day 9 Day 10  Day 11 Day 12   alma rosa hamstring stretch 1e25bhld 1a61ptoa 4k57hbsk 7u56lxcg 1e68tuyt 1a78mghx 4d91dyvr       alma rosa piriformis stretch 4m41hjej 3c27wfbc 1c25evtn 9j50ypsd 5h77wmkl 0l64nvqw 9y96jbug       alma rosa lumbar rotation 29q30ehxx 15y90atpx 66n50rhni 58h98boyb 97f07rqpg 56q79kcan 11c14giyr       alma rosa SL quad stretch    0c22hwhu 2o97nfeh 6q33nhzg 1s40yugb       alma rosa clamshells     7s1qbka 2u7zarf 21a2eifs                                                                                   After being cleared for contradictions and prior to therex, Marianna received IFC Electrical Stimulation along with MHP for pain control applied to the bilateral lumbar paraspinals and posterior hips  for 15 minutes. Marianna tolerated treatment well without any adverse effects.         Patient instructed to cont prior HEP.        Assessment          Marianna Is progressing well towards her goals. Pain level seems to be up and down. Improving with flexibility and strength.      Pt prognosis is Good.       Pt will continue to benefit from skilled outpatient physical therapy to address the deficits listed in the problem list box on initial evaluation, provide  pt/family education and to maximize pt's level of independence in the home and community environment.       Pt's spiritual, cultural and educational needs considered and pt agreeable to plan of care and goals.      Anticipated barriers to physical therapy: none significant.      Evaluation Date: 1/13/22      Goals:     Goals:  Short Term Goals: 2 weeks   1) I HOME EXERCISE PROGRAM (MET)  2) decrease pain to 6/10 (progressing)  3) patient tolerate 40 min of activity (progressing)     Long Term Goals: 4 weeks   1) Functional limitation 59/100% (progressing)  2) Decrease pain to 4/10 (progressing)  3) Normalize gait (progressing)  4) Normalize flexibility (progressing)         Plan         Outpatient Physical Therapy 3 times weekly for 4 weeks to include the following interventions: Electrical Stimulation IFC, Gait Training, Moist Heat/ Ice, Therapeutic Activities and Therapeutic Exercise.             Rena Rubio, PTA

## 2022-02-08 ENCOUNTER — CLINICAL SUPPORT (OUTPATIENT)
Dept: REHABILITATION | Facility: HOSPITAL | Age: 72
End: 2022-02-08
Payer: COMMERCIAL

## 2022-02-08 DIAGNOSIS — M51.36 DEGENERATION OF LUMBAR INTERVERTEBRAL DISC: ICD-10-CM

## 2022-02-08 DIAGNOSIS — M54.16 LUMBAR RADICULOPATHY: Primary | ICD-10-CM

## 2022-02-08 PROCEDURE — 97110 THERAPEUTIC EXERCISES: CPT | Mod: CQ

## 2022-02-08 PROCEDURE — 97014 ELECTRIC STIMULATION THERAPY: CPT | Mod: CQ

## 2022-02-08 NOTE — PROGRESS NOTES
Physical Therapy Treatment Note         Name: Marianna Flex    Clinic Number: [id number]    Physician: Orion Cummings, *       Visit Date: 2/8/2022       Physician Orders: PT eval and treat        Visit #/Visits authorized: 9 / 12              Precautions: none significant         Subjective         Pt reports: My back is really doing much better. My biggest problem right now is my R shoulder is hurting and I can barely lift my arm up.       She was compliant with home exercise program.    Response to previous treatment: I was sick with a stomach virus last week and that's why I missed my appts.             Pain: 4 or 5/10    Location: across low back and L hip/buttock.         Objective         Marianna received therapeutic exercises to develop strength, flexibility and core stabilization for 39 minutes including:    Exercises Day 2 Day 3 Day 4 Day 5  Day 6 Day 7  (Sup Visit) Day 8 Day 9 Day 10  Day 11 Day 12   alma rosa hamstring stretch 1m48hqcw 7c97twtp 7z38fnap 0f31vvem 8g86rkwf 6s57juvi 7g70rhab 6o31pnig      alma rosa piriformis stretch 7t96hmyx 7s84yvfz 4x38elbd 0e74xykp 1n59ctwj 1j77yooh 6l05ztub 2l07wptc      alma rosa lumbar rotation 70f66ghug 52z05tiqh 40u70ofyz 57j43pmbh 35o13gpho 47x34vrrq 78l39nqhm 23u97mgir      alma rosa SL quad stretch    3h95pkov 9q75tuqv 3n14flad 2r59ezuu 1d56uilh      alma rosa clamshells     3v2ifcm 8s0fwkp 61g8aqyv 44r0tdux                                                                                  After being cleared for contradictions and prior to therex, Marianna received IFC Electrical Stimulation along with MHP for pain control applied to the bilateral lumbar paraspinals and posterior hips  for 15 minutes. Marianna tolerated treatment well without any adverse effects.         Patient instructed to cont prior HEP.        Assessment          Marianna Is progressing well towards her goals. Good progress with Pain level.      Pt prognosis is Good.       Pt will continue to benefit from skilled  outpatient physical therapy to address the deficits listed in the problem list box on initial evaluation, provide pt/family education and to maximize pt's level of independence in the home and community environment.       Pt's spiritual, cultural and educational needs considered and pt agreeable to plan of care and goals.      Anticipated barriers to physical therapy: none significant.      Evaluation Date: 1/13/22      Goals:     Goals:  Short Term Goals: 2 weeks   1) I HOME EXERCISE PROGRAM (MET)  2) decrease pain to 6/10 (MET)  3) patient tolerate 40 min of activity (progressing)     Long Term Goals: 4 weeks   1) Functional limitation 59/100% (progressing)  2) Decrease pain to 4/10 (progressing)  3) Normalize gait (progressing)  4) Normalize flexibility (progressing)         Plan         Outpatient Physical Therapy 3 times weekly for 4 weeks to include the following interventions: Electrical Stimulation IFC, Gait Training, Moist Heat/ Ice, Therapeutic Activities and Therapeutic Exercise.             Rena Rubio, PTA

## 2022-02-09 ENCOUNTER — CLINICAL SUPPORT (OUTPATIENT)
Dept: REHABILITATION | Facility: HOSPITAL | Age: 72
End: 2022-02-09
Payer: COMMERCIAL

## 2022-02-09 DIAGNOSIS — M54.16 LUMBAR RADICULOPATHY: Primary | ICD-10-CM

## 2022-02-09 DIAGNOSIS — M51.36 DEGENERATION OF LUMBAR INTERVERTEBRAL DISC: ICD-10-CM

## 2022-02-09 PROCEDURE — 97110 THERAPEUTIC EXERCISES: CPT | Mod: CQ

## 2022-02-09 PROCEDURE — 97014 ELECTRIC STIMULATION THERAPY: CPT | Mod: CQ

## 2022-02-09 NOTE — PROGRESS NOTES
Physical Therapy Treatment Note         Name: Marianna Flex    Clinic Number: [id number]    Physician: Raiza Perez MD       Visit Date: 2/9/2022       Physician Orders: PT eval and treat        Visit #/Visits authorized: 10 / 12              Precautions: none significant         Subjective         Pt reports: No new c/o.       She was compliant with home exercise program.    Response to previous treatment: less overall tightness in back and legs.             Pain: 4/10    Location: across low back and L hip/buttock.         Objective         Marianna received therapeutic exercises to develop strength, flexibility and core stabilization for 40 minutes including:    Exercises Day 2 Day 3 Day 4 Day 5  Day 6 Day 7  (Sup Visit) Day 8 Day 9 Day 10  Day 11 Day 12   alma rosa hamstring stretch 5e24antn 8q93bzri 2f72wtpn 0p23mjcx 2l49ymfz 4m72phaz 3v81ewdi 8u53klou 2z61fkns     alma rosa piriformis stretch 7d68tvhj 7u68jcnj 6z93rcrs 0p97vogy 8e15pzcn 9s98pdwk 3x17qqjx 3f45afht 4y77rual     alma rosa lumbar rotation 39j71xevz 13b63lbfu 48b56xchn 69r31cjik 44e01hglw 86z18lqkk 79x02wngm 94k92izwa 03f27twsk     alma rosa SL quad stretch    9q39aivn 9x82edtc 5h56ggqf 2o72svoc 7o04nnca 7r17zxqm     alma rosa clamshells     6m0nxor 1o6snpg 46w2tjan 57r9dxqy 93o6krod                                                                                 After being cleared for contradictions and prior to therex, Marianna received IFC Electrical Stimulation along with MHP for pain control applied to the bilateral lumbar paraspinals and posterior hips  for 15 minutes. Marianna tolerated treatment well without any adverse effects.         Patient instructed to cont prior HEP.        Assessment          Marianna Is progressing well towards her goals. Good progress with Pain level.      Pt prognosis is Good.       Pt will continue to benefit from skilled outpatient physical therapy to address the deficits listed in the problem list box on initial evaluation, provide  pt/family education and to maximize pt's level of independence in the home and community environment.       Pt's spiritual, cultural and educational needs considered and pt agreeable to plan of care and goals.      Anticipated barriers to physical therapy: none significant.      Evaluation Date: 1/13/22      Goals:     Goals:  Short Term Goals: 2 weeks   1) I HOME EXERCISE PROGRAM (MET)  2) decrease pain to 6/10 (MET)  3) patient tolerate 40 min of activity (progressing)     Long Term Goals: 4 weeks   1) Functional limitation 59/100% (progressing)  2) Decrease pain to 4/10 (progressing)  3) Normalize gait (progressing)  4) Normalize flexibility (progressing)         Plan    Increase strengthening.    Outpatient Physical Therapy 3 times weekly for 4 weeks to include the following interventions: Electrical Stimulation IFC, Gait Training, Moist Heat/ Ice, Therapeutic Activities and Therapeutic Exercise.             Rena Rubio, PTA

## 2022-02-10 ENCOUNTER — CLINICAL SUPPORT (OUTPATIENT)
Dept: REHABILITATION | Facility: HOSPITAL | Age: 72
End: 2022-02-10
Payer: COMMERCIAL

## 2022-02-10 DIAGNOSIS — M54.16 LUMBAR RADICULOPATHY: Primary | ICD-10-CM

## 2022-02-10 DIAGNOSIS — M51.36 DEGENERATION OF LUMBAR INTERVERTEBRAL DISC: ICD-10-CM

## 2022-02-10 PROCEDURE — 97014 ELECTRIC STIMULATION THERAPY: CPT | Mod: CQ

## 2022-02-10 PROCEDURE — 97110 THERAPEUTIC EXERCISES: CPT | Mod: CQ

## 2022-02-10 NOTE — PROGRESS NOTES
Physical Therapy Treatment Note         Name: Marianna Flex    Clinic Number: [id number]    Physician: Raiza Perez MD       Visit Date: 2/10/2022       Physician Orders: PT eval and treat        Visit #/Visits authorized: 11 / 12              Precautions: none significant         Subjective         Pt reports: More pain today.       She was compliant with home exercise program.    Response to previous treatment: less overall tightness in back and legs.             Pain: 7/10    Location: across low back and L hip/buttock.         Objective         Marianna received therapeutic exercises to develop strength, flexibility and core stabilization for 46 minutes including:    Exercises Day 2 Day 3 Day 4 Day 5  Day 6 Day 7  (Sup Visit) Day 8 Day 9 Day 10  Day 11 Day 12   alma rosa hamstring stretch 3y72dpsl 9t46xiie 4j45yxek 4d85whfn 8s04dtdb 9w22actp 1k55ykrv 5u65bcle 8e18baxb 8i67rmhh    alma rosa piriformis stretch 3y89ldei 0x42xjjm 3r63qfzw 0z67olmv 4s75wjxu 8o94leny 5x04xuki 3t10wixf 5g15xivi 3l83cyqm    alma rosa lumbar rotation 06b06zqgd 55s52itbx 74i22hffo 50x77mrvh 29s07ooxb 82x83xwei 26u87zlev 54t86yjez 80h40oluk 42f70hzes    alma rosa SL quad stretch    0k38rhyv 3t29zhbr 3o77gagh 9g73vmau 9g88zihb 4s65ywyv 4p35wujk    alma rosa clamshells     8j7sojy 8i7vlxs 13l9dasi 15x0agmg 66y4ymcc 64g9tvyq    Nu step          thk1k2omsu                                                              Pt tolerated initiation of nu step to address general strength and endurance deficits.     After being cleared for contradictions and prior to therex, Marianna received IFC Electrical Stimulation along with MHP for pain control applied to the bilateral lumbar paraspinals and posterior hips  for 15 minutes. Marianna tolerated treatment well without any adverse effects.         Patient instructed to cont prior HEP.        Assessment          Marianna Is progressing towards her goals. Pain level is up and down according to pt.      Pt prognosis is Good.        Pt will continue to benefit from skilled outpatient physical therapy to address the deficits listed in the problem list box on initial evaluation, provide pt/family education and to maximize pt's level of independence in the home and community environment.       Pt's spiritual, cultural and educational needs considered and pt agreeable to plan of care and goals.      Anticipated barriers to physical therapy: none significant.      Evaluation Date: 1/13/22      Goals:     Goals:  Short Term Goals: 2 weeks   1) I HOME EXERCISE PROGRAM (MET)  2) decrease pain to 6/10 (MET)  3) patient tolerate 40 min of activity (progressing)     Long Term Goals: 4 weeks   1) Functional limitation 59/100% (progressing)  2) Decrease pain to 4/10 (progressing)  3) Normalize gait (progressing)  4) Normalize flexibility (progressing)         Plan      Cont to increase strengthening.      Outpatient Physical Therapy 3 times weekly for 4 weeks to include the following interventions: Electrical Stimulation IFC, Gait Training, Moist Heat/ Ice, Therapeutic Activities and Therapeutic Exercise.             Rena Rubio, PTA

## 2022-02-22 ENCOUNTER — HOSPITAL ENCOUNTER (OUTPATIENT)
Facility: HOSPITAL | Age: 72
Discharge: HOME | End: 2022-02-24
Attending: HOSPITALIST | Admitting: HOSPITALIST
Payer: COMMERCIAL

## 2022-02-22 DIAGNOSIS — R11.2 NON-INTRACTABLE VOMITING WITH NAUSEA, UNSPECIFIED VOMITING TYPE: ICD-10-CM

## 2022-02-22 DIAGNOSIS — N17.9 AKI (ACUTE KIDNEY INJURY): Primary | ICD-10-CM

## 2022-02-22 DIAGNOSIS — R19.7 DIARRHEA, UNSPECIFIED TYPE: ICD-10-CM

## 2022-02-22 DIAGNOSIS — E86.0 DEHYDRATION: ICD-10-CM

## 2022-02-22 DIAGNOSIS — U07.1 COVID-19: ICD-10-CM

## 2022-02-22 LAB
ALBUMIN SERPL BCP-MCNC: 3.8 G/DL (ref 3.5–5)
ALBUMIN/GLOB SERPL: 0.8 {RATIO}
ALP SERPL-CCNC: 122 U/L (ref 55–142)
ALT SERPL W P-5'-P-CCNC: 23 U/L (ref 13–56)
AMORPH PHOS CRY #/AREA URNS LPF: ABNORMAL /LPF
ANION GAP SERPL CALCULATED.3IONS-SCNC: 17 MMOL/L (ref 7–16)
AST SERPL W P-5'-P-CCNC: 16 U/L (ref 15–37)
BACTERIA #/AREA URNS HPF: ABNORMAL /HPF
BASOPHILS # BLD AUTO: 0.07 K/UL (ref 0–0.2)
BASOPHILS NFR BLD AUTO: 0.8 % (ref 0–1)
BILIRUB SERPL-MCNC: 0.6 MG/DL (ref 0–1.2)
BILIRUB UR QL STRIP: ABNORMAL
BUN SERPL-MCNC: 28 MG/DL (ref 7–18)
BUN/CREAT SERPL: 14 (ref 6–20)
CALCIUM SERPL-MCNC: 9.1 MG/DL (ref 8.5–10.1)
CHLORIDE SERPL-SCNC: 100 MMOL/L (ref 98–107)
CLARITY UR: CLEAR
CO2 SERPL-SCNC: 24 MMOL/L (ref 21–32)
COLOR UR: YELLOW
CREAT SERPL-MCNC: 1.98 MG/DL (ref 0.55–1.02)
DIFFERENTIAL METHOD BLD: ABNORMAL
EOSINOPHIL # BLD AUTO: 0.34 K/UL (ref 0–0.5)
EOSINOPHIL NFR BLD AUTO: 4.1 % (ref 1–4)
ERYTHROCYTE [DISTWIDTH] IN BLOOD BY AUTOMATED COUNT: 13.3 % (ref 11.5–14.5)
FLUAV AG UPPER RESP QL IA.RAPID: NEGATIVE
FLUBV AG UPPER RESP QL IA.RAPID: NEGATIVE
GLOBULIN SER-MCNC: 4.6 G/DL (ref 2–4)
GLUCOSE SERPL-MCNC: 192 MG/DL (ref 70–105)
GLUCOSE SERPL-MCNC: 220 MG/DL (ref 74–106)
GLUCOSE UR STRIP-MCNC: NEGATIVE MG/DL
HCT VFR BLD AUTO: 37.5 % (ref 38–47)
HGB BLD-MCNC: 12.5 G/DL (ref 12–16)
KETONES UR STRIP-SCNC: 15 MG/DL
LEUKOCYTE ESTERASE UR QL STRIP: ABNORMAL
LIPASE SERPL-CCNC: 229 U/L (ref 73–393)
LYMPHOCYTES # BLD AUTO: 1.96 K/UL (ref 1–4.8)
LYMPHOCYTES NFR BLD AUTO: 23.6 % (ref 27–41)
MCH RBC QN AUTO: 27.1 PG (ref 27–31)
MCHC RBC AUTO-ENTMCNC: 33.3 G/DL (ref 32–36)
MCV RBC AUTO: 81.2 FL (ref 80–96)
MONOCYTES # BLD AUTO: 0.44 K/UL (ref 0–0.8)
MONOCYTES NFR BLD AUTO: 5.3 % (ref 2–6)
MPC BLD CALC-MCNC: 10.4 FL (ref 9.4–12.4)
NEUTROPHILS # BLD AUTO: 5.49 K/UL (ref 1.8–7.7)
NEUTROPHILS NFR BLD AUTO: 66.2 % (ref 53–65)
NITRITE UR QL STRIP: NEGATIVE
PH UR STRIP: 5.5 PH UNITS
PLATELET # BLD AUTO: 364 K/UL (ref 150–400)
POTASSIUM SERPL-SCNC: 3.8 MMOL/L (ref 3.5–5.1)
PROT SERPL-MCNC: 8.4 G/DL (ref 6.4–8.2)
PROT UR QL STRIP: ABNORMAL
RBC # BLD AUTO: 4.62 M/UL (ref 4.2–5.4)
RBC # UR STRIP: NEGATIVE /UL
RBC #/AREA URNS HPF: ABNORMAL /HPF
SARS-COV+SARS-COV-2 AG RESP QL IA.RAPID: POSITIVE
SODIUM SERPL-SCNC: 137 MMOL/L (ref 136–145)
SP GR UR STRIP: 1.01
SQUAMOUS #/AREA URNS LPF: ABNORMAL /LPF
TROPONIN I SERPL HS-MCNC: 10 PG/ML
UROBILINOGEN UR STRIP-ACNC: 0.2 MG/DL
WBC # BLD AUTO: 8.3 K/UL (ref 4.5–11)
WBC #/AREA URNS HPF: ABNORMAL /HPF

## 2022-02-22 PROCEDURE — 96374 THER/PROPH/DIAG INJ IV PUSH: CPT

## 2022-02-22 PROCEDURE — 81001 URINALYSIS AUTO W/SCOPE: CPT | Performed by: NURSE PRACTITIONER

## 2022-02-22 PROCEDURE — 99284 PR EMERGENCY DEPT VISIT,LEVEL IV: ICD-10-PCS | Mod: ,,, | Performed by: NURSE PRACTITIONER

## 2022-02-22 PROCEDURE — 63600175 PHARM REV CODE 636 W HCPCS: Performed by: NURSE PRACTITIONER

## 2022-02-22 PROCEDURE — 25000003 PHARM REV CODE 250: Performed by: NURSE PRACTITIONER

## 2022-02-22 PROCEDURE — 82962 GLUCOSE BLOOD TEST: CPT

## 2022-02-22 PROCEDURE — 80053 COMPREHEN METABOLIC PANEL: CPT | Performed by: NURSE PRACTITIONER

## 2022-02-22 PROCEDURE — 36415 COLL VENOUS BLD VENIPUNCTURE: CPT | Performed by: NURSE PRACTITIONER

## 2022-02-22 PROCEDURE — 96361 HYDRATE IV INFUSION ADD-ON: CPT

## 2022-02-22 PROCEDURE — 99285 EMERGENCY DEPT VISIT HI MDM: CPT | Mod: 25

## 2022-02-22 PROCEDURE — 84484 ASSAY OF TROPONIN QUANT: CPT | Performed by: NURSE PRACTITIONER

## 2022-02-22 PROCEDURE — 85025 COMPLETE CBC W/AUTO DIFF WBC: CPT | Performed by: NURSE PRACTITIONER

## 2022-02-22 PROCEDURE — 87428 SARSCOV & INF VIR A&B AG IA: CPT | Performed by: NURSE PRACTITIONER

## 2022-02-22 PROCEDURE — 99284 EMERGENCY DEPT VISIT MOD MDM: CPT | Mod: ,,, | Performed by: NURSE PRACTITIONER

## 2022-02-22 PROCEDURE — 83690 ASSAY OF LIPASE: CPT | Performed by: NURSE PRACTITIONER

## 2022-02-22 RX ORDER — POLYETHYLENE GLYCOL 3350 17 G/17G
17 POWDER, FOR SOLUTION ORAL DAILY
COMMUNITY

## 2022-02-22 RX ORDER — INSULIN DEGLUDEC 200 U/ML
50 INJECTION, SOLUTION SUBCUTANEOUS 2 TIMES DAILY
COMMUNITY
Start: 2022-01-07

## 2022-02-22 RX ORDER — TIZANIDINE 2 MG/1
1 TABLET ORAL 2 TIMES DAILY
COMMUNITY

## 2022-02-22 RX ORDER — OMEPRAZOLE 20 MG/1
1 CAPSULE, DELAYED RELEASE ORAL DAILY
COMMUNITY

## 2022-02-22 RX ORDER — INSULIN ASPART 100 [IU]/ML
INJECTION, SOLUTION INTRAVENOUS; SUBCUTANEOUS
COMMUNITY
Start: 2021-09-20

## 2022-02-22 RX ORDER — GABAPENTIN 300 MG/1
600 CAPSULE ORAL 3 TIMES DAILY
COMMUNITY
Start: 2022-01-18 | End: 2022-03-01 | Stop reason: SDUPTHER

## 2022-02-22 RX ORDER — SILVER SULFADIAZINE 10 G/1000G
1 CREAM TOPICAL
COMMUNITY

## 2022-02-22 RX ORDER — ONDANSETRON 2 MG/ML
4 INJECTION INTRAMUSCULAR; INTRAVENOUS
Status: COMPLETED | OUTPATIENT
Start: 2022-02-22 | End: 2022-02-22

## 2022-02-22 RX ORDER — LISINOPRIL AND HYDROCHLOROTHIAZIDE 10; 12.5 MG/1; MG/1
1 TABLET ORAL DAILY
COMMUNITY

## 2022-02-22 RX ORDER — CETIRIZINE HYDROCHLORIDE 10 MG/1
1 TABLET ORAL
COMMUNITY

## 2022-02-22 RX ADMIN — SODIUM CHLORIDE 500 ML: 9 INJECTION, SOLUTION INTRAVENOUS at 10:02

## 2022-02-22 RX ADMIN — ONDANSETRON 4 MG: 2 INJECTION INTRAMUSCULAR; INTRAVENOUS at 10:02

## 2022-02-22 RX ADMIN — SODIUM CHLORIDE 500 ML: 9 INJECTION, SOLUTION INTRAVENOUS at 11:02

## 2022-02-23 PROBLEM — R73.9 HYPERGLYCEMIA: Chronic | Status: ACTIVE | Noted: 2022-02-23

## 2022-02-23 PROBLEM — N17.9 AKI (ACUTE KIDNEY INJURY): Status: ACTIVE | Noted: 2022-02-23

## 2022-02-23 PROBLEM — U07.1 COVID-19: Status: ACTIVE | Noted: 2022-02-23

## 2022-02-23 PROBLEM — E86.0 DEHYDRATION: Status: ACTIVE | Noted: 2022-02-23

## 2022-02-23 PROBLEM — R11.2 NAUSEA & VOMITING: Status: ACTIVE | Noted: 2022-02-23

## 2022-02-23 PROBLEM — I10 ESSENTIAL HYPERTENSION: Chronic | Status: ACTIVE | Noted: 2022-02-23

## 2022-02-23 LAB
ALBUMIN SERPL BCP-MCNC: 3.1 G/DL (ref 3.5–5)
ALBUMIN/GLOB SERPL: 0.8 {RATIO}
ALP SERPL-CCNC: 104 U/L (ref 55–142)
ALT SERPL W P-5'-P-CCNC: 22 U/L (ref 13–56)
ANION GAP SERPL CALCULATED.3IONS-SCNC: 15 MMOL/L (ref 7–16)
AST SERPL W P-5'-P-CCNC: 15 U/L (ref 15–37)
BASOPHILS # BLD AUTO: 0.1 K/UL (ref 0–0.2)
BASOPHILS NFR BLD AUTO: 1.2 % (ref 0–1)
BILIRUB SERPL-MCNC: 0.3 MG/DL (ref 0–1.2)
BUN SERPL-MCNC: 22 MG/DL (ref 7–18)
BUN/CREAT SERPL: 15 (ref 6–20)
CALCIUM SERPL-MCNC: 8.9 MG/DL (ref 8.5–10.1)
CHLORIDE SERPL-SCNC: 105 MMOL/L (ref 98–107)
CO2 SERPL-SCNC: 25 MMOL/L (ref 21–32)
CREAT SERPL-MCNC: 1.47 MG/DL (ref 0.55–1.02)
DIFFERENTIAL METHOD BLD: ABNORMAL
EOSINOPHIL # BLD AUTO: 0.32 K/UL (ref 0–0.5)
EOSINOPHIL NFR BLD AUTO: 3.7 % (ref 1–4)
ERYTHROCYTE [DISTWIDTH] IN BLOOD BY AUTOMATED COUNT: 13.1 % (ref 11.5–14.5)
GLOBULIN SER-MCNC: 4.1 G/DL (ref 2–4)
GLUCOSE SERPL-MCNC: 119 MG/DL (ref 74–106)
GLUCOSE SERPL-MCNC: 124 MG/DL (ref 70–105)
GLUCOSE SERPL-MCNC: 180 MG/DL (ref 70–105)
GLUCOSE SERPL-MCNC: 188 MG/DL (ref 70–105)
GLUCOSE SERPL-MCNC: 204 MG/DL (ref 70–105)
HCT VFR BLD AUTO: 33.4 % (ref 38–47)
HGB BLD-MCNC: 11.2 G/DL (ref 12–16)
LYMPHOCYTES # BLD AUTO: 2.65 K/UL (ref 1–4.8)
LYMPHOCYTES NFR BLD AUTO: 30.7 % (ref 27–41)
MCH RBC QN AUTO: 27.3 PG (ref 27–31)
MCHC RBC AUTO-ENTMCNC: 33.5 G/DL (ref 32–36)
MCV RBC AUTO: 81.3 FL (ref 80–96)
MONOCYTES # BLD AUTO: 0.58 K/UL (ref 0–0.8)
MONOCYTES NFR BLD AUTO: 6.7 % (ref 2–6)
MPC BLD CALC-MCNC: 10.4 FL (ref 9.4–12.4)
NEUTROPHILS # BLD AUTO: 4.98 K/UL (ref 1.8–7.7)
NEUTROPHILS NFR BLD AUTO: 57.7 % (ref 53–65)
PLATELET # BLD AUTO: 348 K/UL (ref 150–400)
POTASSIUM SERPL-SCNC: 3.7 MMOL/L (ref 3.5–5.1)
PROT SERPL-MCNC: 7.2 G/DL (ref 6.4–8.2)
RBC # BLD AUTO: 4.11 M/UL (ref 4.2–5.4)
SODIUM SERPL-SCNC: 141 MMOL/L (ref 136–145)
WBC # BLD AUTO: 8.63 K/UL (ref 4.5–11)

## 2022-02-23 PROCEDURE — 36415 COLL VENOUS BLD VENIPUNCTURE: CPT | Performed by: NURSE PRACTITIONER

## 2022-02-23 PROCEDURE — 85025 COMPLETE CBC W/AUTO DIFF WBC: CPT | Performed by: NURSE PRACTITIONER

## 2022-02-23 PROCEDURE — 82962 GLUCOSE BLOOD TEST: CPT

## 2022-02-23 PROCEDURE — 99220 PR INITIAL OBSERVATION CARE,LEVL III: ICD-10-PCS | Mod: ,,, | Performed by: HOSPITALIST

## 2022-02-23 PROCEDURE — 63600175 PHARM REV CODE 636 W HCPCS: Performed by: NURSE PRACTITIONER

## 2022-02-23 PROCEDURE — 99220 PR INITIAL OBSERVATION CARE,LEVL III: CPT | Mod: ,,, | Performed by: HOSPITALIST

## 2022-02-23 PROCEDURE — 96361 HYDRATE IV INFUSION ADD-ON: CPT

## 2022-02-23 PROCEDURE — 96372 THER/PROPH/DIAG INJ SC/IM: CPT

## 2022-02-23 PROCEDURE — G0378 HOSPITAL OBSERVATION PER HR: HCPCS

## 2022-02-23 PROCEDURE — 80053 COMPREHEN METABOLIC PANEL: CPT | Performed by: NURSE PRACTITIONER

## 2022-02-23 PROCEDURE — 25000003 PHARM REV CODE 250: Performed by: NURSE PRACTITIONER

## 2022-02-23 PROCEDURE — 25000003 PHARM REV CODE 250: Performed by: HOSPITALIST

## 2022-02-23 RX ORDER — IBUPROFEN 200 MG
24 TABLET ORAL
Status: DISCONTINUED | OUTPATIENT
Start: 2022-02-23 | End: 2022-02-24 | Stop reason: HOSPADM

## 2022-02-23 RX ORDER — INSULIN ASPART 100 [IU]/ML
0-5 INJECTION, SOLUTION INTRAVENOUS; SUBCUTANEOUS
Status: DISCONTINUED | OUTPATIENT
Start: 2022-02-23 | End: 2022-02-24 | Stop reason: HOSPADM

## 2022-02-23 RX ORDER — ENOXAPARIN SODIUM 100 MG/ML
30 INJECTION SUBCUTANEOUS EVERY 24 HOURS
Status: DISCONTINUED | OUTPATIENT
Start: 2022-02-23 | End: 2022-02-24 | Stop reason: HOSPADM

## 2022-02-23 RX ORDER — CETIRIZINE HYDROCHLORIDE 10 MG/1
10 TABLET ORAL
Status: DISCONTINUED | OUTPATIENT
Start: 2022-02-23 | End: 2022-02-24 | Stop reason: HOSPADM

## 2022-02-23 RX ORDER — SODIUM CHLORIDE 0.9 % (FLUSH) 0.9 %
10 SYRINGE (ML) INJECTION
Status: DISCONTINUED | OUTPATIENT
Start: 2022-02-23 | End: 2022-02-24 | Stop reason: HOSPADM

## 2022-02-23 RX ORDER — GABAPENTIN 300 MG/1
600 CAPSULE ORAL 3 TIMES DAILY
Status: DISCONTINUED | OUTPATIENT
Start: 2022-02-23 | End: 2022-02-23

## 2022-02-23 RX ORDER — LISINOPRIL 10 MG/1
10 TABLET ORAL DAILY
Status: DISCONTINUED | OUTPATIENT
Start: 2022-02-23 | End: 2022-02-24 | Stop reason: HOSPADM

## 2022-02-23 RX ORDER — SODIUM CHLORIDE 9 MG/ML
1000 INJECTION, SOLUTION INTRAVENOUS CONTINUOUS
Status: ACTIVE | OUTPATIENT
Start: 2022-02-23 | End: 2022-02-24

## 2022-02-23 RX ORDER — SODIUM CHLORIDE 9 MG/ML
1000 INJECTION, SOLUTION INTRAVENOUS CONTINUOUS
Status: DISPENSED | OUTPATIENT
Start: 2022-02-23 | End: 2022-02-23

## 2022-02-23 RX ORDER — PANTOPRAZOLE SODIUM 40 MG/1
40 TABLET, DELAYED RELEASE ORAL DAILY
Status: DISCONTINUED | OUTPATIENT
Start: 2022-02-23 | End: 2022-02-24 | Stop reason: HOSPADM

## 2022-02-23 RX ORDER — ACETAMINOPHEN 325 MG/1
650 TABLET ORAL EVERY 6 HOURS PRN
Status: DISCONTINUED | OUTPATIENT
Start: 2022-02-23 | End: 2022-02-24 | Stop reason: HOSPADM

## 2022-02-23 RX ORDER — GLUCAGON 1 MG
1 KIT INJECTION
Status: DISCONTINUED | OUTPATIENT
Start: 2022-02-23 | End: 2022-02-24 | Stop reason: HOSPADM

## 2022-02-23 RX ORDER — TALC
6 POWDER (GRAM) TOPICAL NIGHTLY PRN
Status: DISCONTINUED | OUTPATIENT
Start: 2022-02-23 | End: 2022-02-24 | Stop reason: HOSPADM

## 2022-02-23 RX ORDER — IBUPROFEN 200 MG
16 TABLET ORAL
Status: DISCONTINUED | OUTPATIENT
Start: 2022-02-23 | End: 2022-02-24 | Stop reason: HOSPADM

## 2022-02-23 RX ORDER — ONDANSETRON 2 MG/ML
4 INJECTION INTRAMUSCULAR; INTRAVENOUS EVERY 6 HOURS PRN
Status: DISCONTINUED | OUTPATIENT
Start: 2022-02-23 | End: 2022-02-24 | Stop reason: HOSPADM

## 2022-02-23 RX ORDER — GABAPENTIN 300 MG/1
300 CAPSULE ORAL 3 TIMES DAILY
Status: DISCONTINUED | OUTPATIENT
Start: 2022-02-23 | End: 2022-02-24 | Stop reason: HOSPADM

## 2022-02-23 RX ADMIN — ACETAMINOPHEN 650 MG: 325 TABLET ORAL at 01:02

## 2022-02-23 RX ADMIN — GABAPENTIN 600 MG: 300 CAPSULE ORAL at 08:02

## 2022-02-23 RX ADMIN — ENOXAPARIN SODIUM 30 MG: 30 INJECTION SUBCUTANEOUS at 05:02

## 2022-02-23 RX ADMIN — ACETAMINOPHEN 650 MG: 325 TABLET ORAL at 08:02

## 2022-02-23 RX ADMIN — CETIRIZINE HYDROCHLORIDE 10 MG: 10 TABLET, FILM COATED ORAL at 08:02

## 2022-02-23 RX ADMIN — GABAPENTIN 300 MG: 300 CAPSULE ORAL at 03:02

## 2022-02-23 RX ADMIN — PANTOPRAZOLE SODIUM 40 MG: 40 TABLET, DELAYED RELEASE ORAL at 08:02

## 2022-02-23 RX ADMIN — LISINOPRIL 10 MG: 10 TABLET ORAL at 08:02

## 2022-02-23 RX ADMIN — INSULIN ASPART 2 UNITS: 100 INJECTION, SOLUTION INTRAVENOUS; SUBCUTANEOUS at 05:02

## 2022-02-23 RX ADMIN — SODIUM CHLORIDE 1000 ML: 9 INJECTION, SOLUTION INTRAVENOUS at 12:02

## 2022-02-23 RX ADMIN — GABAPENTIN 300 MG: 300 CAPSULE ORAL at 08:02

## 2022-02-23 RX ADMIN — MELATONIN 6 MG: at 08:02

## 2022-02-23 NOTE — PROGRESS NOTES
Pharmacist Renal Dose Adjustment Note    Marianna Neal is a 71 y.o. female being treated with the medication gabapentin    Patient Data:    Vital Signs (Most Recent):  Temp: 97.8 °F (36.6 °C) (02/23/22 0410)  Pulse: 81 (02/23/22 0410)  Resp: 18 (02/23/22 0410)  BP: (!) 155/59 (02/23/22 0410)  SpO2: 100 % (02/23/22 0410)   Vital Signs (72h Range):  Temp:  [97.8 °F (36.6 °C)-98.3 °F (36.8 °C)]   Pulse:  [78-82]   Resp:  [18-20]   BP: (137-155)/(59-71)   SpO2:  [99 %-100 %]      Recent Labs   Lab 02/22/22 2231 02/23/22 0722   CREATININE 1.98* 1.47*     Serum creatinine: 1.47 mg/dL (H) 02/23/22 0722  Estimated creatinine clearance: 36 mL/min (A)    Medication:gabapentin dose: 600mg frequency tid will be changed to medication:gabapentin dose:300mg frequency:tid, approved by Dr. Mon.    Pharmacist's Name: Warner Gilbert  Pharmacist's Extension: [phone]

## 2022-02-23 NOTE — ED TRIAGE NOTES
Presents to ED with c/o generalized upper abdominal pain, nausea, vomiting, and diarrhea.  Reports onset of symptoms 6 days ago.  Denies taking any medications for symptom relief.  States she was at work tonight and had increased symptoms.  Tenderness to palpation of abdominal.  Reports 2-3 episodes of diarrhea today and dry heaving without emesis today.  Pt a/a/ox3, respirations even and unlabored, no acute distress noted.

## 2022-02-23 NOTE — ASSESSMENT & PLAN NOTE
Patient with acute kidney injury likely d/t IVVD/Dehydration Which is currently stable. Labs reviewed- Renal function/electrolytes with Estimated Creatinine Clearance: 26.7 mL/min (A) (based on SCr of 1.98 mg/dL (H)). according to latest data. Monitor urine output and serial BMP and adjust therapy as needed. Avoid nephrotoxins and renally dose meds for GFR listed above.

## 2022-02-23 NOTE — HPI
Ms Neal is a pleasant 71 yr old AAF who presents to the ED with c/o N/V/D for the past 6 days.  Reports occasional dry cough and occasional SOB.  Reports went to work tonight and felt worse than earlier today.  Was found to be positive for Covid, have dehydration with MO.  She is a known diabetic and BS was elevated.  She is being admitted for IV hydration and treatment of symptoms.  Feeling better this am, tolerating diet.

## 2022-02-23 NOTE — H&P
[facility] Surgical Unit  [facility] Medicine  History & Physical    Patient Name: Marianna Neal  MRN: [id number]  Patient Class: OP- Observation  Admission Date: 2/22/2022  Attending Physician: Lee Mon DO   Primary Care Provider: JAZMYN Horowitz, JAZMYN         Patient information was obtained from patient and ER records.     Subjective:     Principal Problem:MO (acute kidney injury)    Chief Complaint:   Chief Complaint   Patient presents with    Abdominal Pain    Diarrhea    Vomiting    Nausea        HPI: Ms Neal is a pleasant 71 yr old AAF who presents to the ED with c/o N/V/D for the past 6 days.  Reports occasional dry cough and occasional SOB.  Reports went to work tonight and felt worse than earlier today.  Was found to be positive for Covid, have dehydration with MO.  She is a known diabetic and BS was elevated.  She is being admitted for IV hydration and treatment of symptoms.  Feeling better this am, tolerating diet.       Past Medical History:   Diagnosis Date    Diabetes mellitus     Hypertension        Past Surgical History:   Procedure Laterality Date    APPENDECTOMY      CHOLECYSTECTOMY      HERNIA REPAIR      HYSTERECTOMY         Review of patient's allergies indicates:  No Known Allergies    No current facility-administered medications on file prior to encounter.     Current Outpatient Medications on File Prior to Encounter   Medication Sig    insulin degludec (TRESIBA FLEXTOUCH U-200) 200 unit/mL (3 mL) insulin pen Inject 50 Units into the skin 2 (two) times a day.    cetirizine (ZYRTEC) 10 MG tablet Take 1 tablet by mouth as needed.    gabapentin (NEURONTIN) 300 MG capsule Take 600 mg by mouth 3 (three) times daily.    insulin aspart U-100 (NOVOLOG) 100 unit/mL (3 mL) InPn pen     lisinopriL-hydrochlorothiazide (PRINZIDE,ZESTORETIC) 10-12.5 mg per tablet Take 1 tablet by mouth once daily.    omeprazole (PRILOSEC) 20 MG capsule Take 1 capsule by mouth once daily.     polyethylene glycol (GLYCOLAX) 17 gram/dose powder Take 17 g by mouth once daily.    silver sulfADIAZINE 1% (SSD) 1 % cream Apply 1 application topically as needed.    tiZANidine (ZANAFLEX) 2 MG tablet Take 1 tablet by mouth 2 (two) times a day.     Family History    None       Tobacco Use    Smoking status: Never Smoker    Smokeless tobacco: Never Used   Substance and Sexual Activity    Alcohol use: Never    Drug use: Never    Sexual activity: Not Currently     Review of Systems   Constitutional:  Negative for appetite change, chills and fever.   Respiratory:  Negative for cough, shortness of breath and wheezing.    Cardiovascular:  Negative for chest pain, palpitations and leg swelling.   Gastrointestinal:  Positive for diarrhea, nausea and vomiting. Negative for abdominal distention.   Genitourinary:  Negative for dysuria.   Skin:  Negative for rash.   Neurological:  Negative for dizziness, seizures and syncope.   Psychiatric/Behavioral:  Negative for agitation, behavioral problems and confusion.    All other systems reviewed and are negative.  Objective:     Vital Signs (Most Recent):  Temp: 98.3 °F (36.8 °C) (02/22/22 2240)  Pulse: 80 (02/23/22 0012)  Resp: 18 (02/23/22 0012)  BP: 137/62 (02/23/22 0012)  SpO2: 100 % (02/23/22 0012) Vital Signs (24h Range):  Temp:  [98.3 °F (36.8 °C)] 98.3 °F (36.8 °C)  Pulse:  [78-82] 80  Resp:  [18-20] 18  SpO2:  [99 %-100 %] 100 %  BP: (137-154)/(60-71) 137/62     Weight: 89.7 kg (197 lb 12.8 oz)  Body mass index is 36.18 kg/m².    Physical Exam  Vitals reviewed.   Constitutional:       General: She is not in acute distress.     Appearance: Normal appearance.   HENT:      Head: Normocephalic and atraumatic.   Eyes:      General: No scleral icterus.     Extraocular Movements: Extraocular movements intact.      Conjunctiva/sclera: Conjunctivae normal.      Pupils: Pupils are equal, round, and reactive to light.   Cardiovascular:      Rate and Rhythm: Normal rate and  regular rhythm.      Heart sounds: No murmur heard.    No friction rub. No gallop.   Pulmonary:      Effort: Pulmonary effort is normal. No respiratory distress.      Breath sounds: Normal breath sounds. No wheezing or rales.   Abdominal:      General: Abdomen is flat. Bowel sounds are normal. There is no distension.      Palpations: Abdomen is soft.      Tenderness: There is no abdominal tenderness. There is no guarding.      Comments: Obese, very mild TTP in epigastric area    Musculoskeletal:         General: No swelling.   Skin:     General: Skin is warm and dry.      Coloration: Skin is not jaundiced.      Findings: No rash.   Neurological:      General: No focal deficit present.      Mental Status: She is alert and oriented to person, place, and time.      Sensory: No sensory deficit.      Motor: No weakness.   Psychiatric:         Mood and Affect: Mood normal.         Thought Content: Thought content normal.         Judgment: Judgment normal.         CRANIAL NERVES     CN III, IV, VI   Pupils are equal, round, and reactive to light.     Significant Labs: All pertinent labs within the past 24 hours have been reviewed.  Recent Lab Results         02/23/22  1022   02/23/22  0722   02/23/22  0530   02/22/22  2231   02/22/22  2218        Influenza B, Molecular       Negative         Albumin/Globulin Ratio   0.8     0.8         Albumin   3.1     3.8         Alkaline Phosphatase   104     122         ALT   22     23         Amorphous, UA       Few         Anion Gap   15     17         Appearance, UA       Clear         AST   15     16         Bacteria, UA       Few         Baso #   0.10     0.07         Basophil %   1.2     0.8         Bilirubin (UA)       Small         BILIRUBIN TOTAL   0.3     0.6         BUN   22     28         BUN/CREAT RATIO   15     14         Calcium   8.9     9.1         Chloride   105     100         CO2   25     24         Color, UA       Yellow         COVID-19 Ag       Positive          Creatinine   1.47     1.98         Differential Type   Auto     Auto         eGFR if non    37     26         Eos #   0.32     0.34         Eosinophil %   3.7     4.1         Globulin, Total   4.1     4.6         Glucose   119     220         Glucose, UA       Negative         Hematocrit   33.4     37.5         Hemoglobin   11.2     12.5         Influenza A       Negative         Ketones, UA       15          Leukocytes, UA       Trace         Lipase       229         Lymph #   2.65     1.96         Lymph %   30.7     23.6         MCH   27.3     27.1         MCHC   33.5     33.3         MCV   81.3     81.2         Mono #   0.58     0.44         Mono %   6.7     5.3         MPV   10.4     10.4         Neutrophils, Abs   4.98     5.49         Neutrophils Relative   57.7     66.2         NITRITE UA       Negative         Occult Blood UA       Negative         pH, UA       5.5         Platelets   348     364         POC Glucose 180     124     192       Potassium   3.7     3.8         PROTEIN TOTAL   7.2     8.4         Protein, UA       Trace         RBC   4.11     4.62         RBC, UA       3-5         RDW   13.1     13.3         Sodium   141     137         Specific Emmet, UA       1.015         Squam Epithel, UA       Moderate         Troponin I High Sensitivity       10.0         UROBILINOGEN UA       0.2         WBC, UA       5-10         WBC   8.63     8.30                                Significant Imaging: I have reviewed all pertinent imaging results/findings within the past 24 hours.    Assessment/Plan:     * MO (acute kidney injury)  Patient with acute kidney injury likely d/t IVVD/Dehydration Which is currently stable. Labs reviewed- Renal function/electrolytes with Estimated Creatinine Clearance: 26.7 mL/min (A) (based on SCr of 1.98 mg/dL (H)). according to latest data. Monitor urine output and serial BMP and adjust therapy as needed. Avoid nephrotoxins and renally dose meds for GFR  listed above.       Nausea & vomiting  Zofran PRN  IV fluids      Hyperglycemia  Monitor POC BS  SSI      Essential hypertension  Monitor Blood sugar  Continue home medications    COVID-19  Isolation  Treat symptoms    Dehydration  IV hydration, renal function is better.       VTE Risk Mitigation (From admission, onward)         Ordered     enoxaparin injection 30 mg  Daily         02/23/22 0022     IP VTE HIGH RISK PATIENT  Once         02/23/22 0022     Place sequential compression device  Until discontinued         02/23/22 0022                   Lee Mon DO  Department of Hospital Medicine   [facility]

## 2022-02-23 NOTE — PLAN OF CARE
Problem: Diabetes Comorbidity  Goal: Blood Glucose Level Within Targeted Range  Outcome: Ongoing, Progressing

## 2022-02-23 NOTE — ED PROVIDER NOTES
Encounter Date: 2/22/2022       History     Chief Complaint   Patient presents with    Abdominal Pain    Diarrhea    Vomiting    Nausea     Subjective:     This 71 y.o. female presents with nausea and vomiting of dry heaves and upper abdominal pain.  Onset of symptoms was gradual starting 6 days ago with unchanged course since that time. Symptoms have been occuring  intermittently.  Outpatient therapy with n/a has been attempted and symptoms have failed to improve. Symptoms are currently rated moderate. Associated signs & symptoms include none.  States had planned to see her PCP Friday but reports when she went to work tonight she felt worse so she left and came to ED.     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 No pertinent past medical history.   History reviewed. No pertinent surgical history.   (Not in a hospital admission)    Review of patient's allergies indicates:  Not on File   Social History    Tobacco Use      Smoking status: Not on file      Smokeless tobacco: Not on file    Alcohol use: Not on file     History reviewed.  No pertinent family history.             Review of patient's allergies indicates:  No Known Allergies  Past Medical History:   Diagnosis Date    Diabetes mellitus     Hypertension      Past Surgical History:   Procedure Laterality Date    APPENDECTOMY      CHOLECYSTECTOMY      HERNIA REPAIR      HYSTERECTOMY       History reviewed. No pertinent family history.  Social History     Tobacco Use    Smoking status: Never Smoker    Smokeless tobacco: Never Used   Substance Use Topics    Alcohol use: Never    Drug use: Never     Review of Systems    Constitutional: Positive for appetite change, chills and fatigue. Negative for fever.   HENT: Negative.    Respiratory: Positive for cough and shortness of breath. Negative for wheezing.         SOB At times, dry cough at times   Cardiovascular: Negative for chest pain.   Gastrointestinal: Positive for abdominal pain, diarrhea, nausea and vomiting.   Genitourinary: Negative for difficulty urinating.   Musculoskeletal: Negative.    Skin: Negative.    Neurological: Negative.    Psychiatric/Behavioral: Negative.        Physical Exam     Initial Vitals [02/22/22 2240]   BP Pulse Resp Temp SpO2   139/60 82 20 98.3 °F (36.8 °C) 99 %      MAP       --         Physical Exam    Nursing note and vitals reviewed.  Constitutional: She appears well-developed and well-nourished.   HENT:   Mouth/Throat: Mucous membranes are dry.   Neck: Neck supple.   Cardiovascular: Normal rate and regular rhythm.   Pulmonary/Chest: Breath sounds normal. She has no wheezes. She has no rhonchi. She has no rales.   Abdominal: Abdomen is soft and protuberant. Bowel sounds are increased. There is generalized abdominal tenderness.   Musculoskeletal:         General: No tenderness or edema. Normal range of motion.      Cervical back: Neck supple.     Neurological: She is alert and oriented to person, place, and time. GCS score is 15. GCS eye subscore is 4. GCS verbal subscore is 5. GCS motor subscore is 6.   Skin: Skin is warm and dry. Capillary refill takes less than 2 seconds.   Psychiatric: She has a normal mood and affect.         Medical Screening Exam   See Full Note    ED Course   Procedures  Labs Reviewed   COMPREHENSIVE METABOLIC PANEL - Abnormal; Notable for the following components:       Result Value    Anion Gap 17 (*)     Glucose 220 (*)     BUN 28 (*)     Creatinine 1.98 (*)     Total Protein 8.4 (*)     Globulin 4.6 (*)     eGFR 26 (*)     All other components within normal limits   URINALYSIS - Abnormal; Notable for the following  components:    Leukocytes, UA Trace (*)     Protein, UA Trace (*)     Ketones, UA 15  (*)     Bilirubin, UA Small (*)     All other components within normal limits   CBC WITH DIFFERENTIAL - Abnormal; Notable for the following components:    Hematocrit 37.5 (*)     Neutrophils % 66.2 (*)     Lymphocytes % 23.6 (*)     Eosinophils % 4.1 (*)     All other components within normal limits   SARS-COV2 (COVID) W/ FLU ANTIGEN - Abnormal; Notable for the following components:    COVID-19 Ag Positive (*)     All other components within normal limits    Narrative:     Positive SARS-CoV antigen results indicate the presence of viral antigens; correlation with patient history and presence of clinical signs & symptoms consistent with COVID-19 are necessary to determine infection status.   URINALYSIS, MICROSCOPIC - Abnormal; Notable for the following components:    WBC, UA 5-10 (*)     RBC, UA 3-5 (*)     Bacteria, UA Few (*)     Squamous Epithelial Cells, UA Moderate (*)     Amorphous Crystals, UA Few (*)     All other components within normal limits   POCT GLUCOSE MONITORING CONTINUOUS - Abnormal; Notable for the following components:    POC Glucose 192 (*)     All other components within normal limits   TROPONIN I - Normal   LIPASE - Normal   CBC W/ AUTO DIFFERENTIAL    Narrative:     The following orders were created for panel order CBC Auto Differential.  Procedure                               Abnormality         Status                     ---------                               -----------         ------                     CBC with Differential[[id number]]        Abnormal            Final result                 Please view results for these tests on the individual orders.   POCT GLUCOSE MONITORING CONTINUOUS          Imaging Results          X-Ray Chest 1 View (In process)                  Medications   sodium chloride 0.9% bolus 500 mL (0 mLs Intravenous Stopped 2/22/22 7945)   ondansetron injection 4 mg (4 mg Intravenous  Given 2/22/22 2230)   sodium chloride 0.9% bolus 500 mL (500 mLs Intravenous New Bag 2/22/22 2310)                 ED Course as of 02/23/22 0011 Tue Feb 22, 2022   2305 Reports abdomen sore but denies pain.  Reports she thinks is due to vomiting.  [CG]   8278 Paged Dr. De Leon to discuss admit [CG]   2344 Discussed pt status, POC and med rec with Dr. De Leon.  [CG]   Wed Feb 23, 2022   0001 Discussed findings and POC with Ms Neal and her daughter.  She agreed with plan to admit for IV fluids.   [CG]      ED Course User Index  [CG] JAZMYN Knox          Clinical Impression:   Final diagnoses:  [N17.9] MO (acute kidney injury) (Primary)  [E86.0] Dehydration  [U07.1] COVID-19  [R11.2] Non-intractable vomiting with nausea, unspecified vomiting type  [R19.7] Diarrhea, unspecified type          ED Disposition Condition    Observation               JAZMYN Knox  02/23/22 0011

## 2022-02-23 NOTE — SUBJECTIVE & OBJECTIVE
Past Medical History:   Diagnosis Date    Diabetes mellitus     Hypertension        Past Surgical History:   Procedure Laterality Date    APPENDECTOMY      CHOLECYSTECTOMY      HERNIA REPAIR      HYSTERECTOMY         Review of patient's allergies indicates:  No Known Allergies    No current facility-administered medications on file prior to encounter.     Current Outpatient Medications on File Prior to Encounter   Medication Sig    insulin degludec (TRESIBA FLEXTOUCH U-200) 200 unit/mL (3 mL) insulin pen Inject 50 Units into the skin 2 (two) times a day.    cetirizine (ZYRTEC) 10 MG tablet Take 1 tablet by mouth as needed.    gabapentin (NEURONTIN) 300 MG capsule Take 600 mg by mouth 3 (three) times daily.    insulin aspart U-100 (NOVOLOG) 100 unit/mL (3 mL) InPn pen     lisinopriL-hydrochlorothiazide (PRINZIDE,ZESTORETIC) 10-12.5 mg per tablet Take 1 tablet by mouth once daily.    omeprazole (PRILOSEC) 20 MG capsule Take 1 capsule by mouth once daily.    polyethylene glycol (GLYCOLAX) 17 gram/dose powder Take 17 g by mouth once daily.    silver sulfADIAZINE 1% (SSD) 1 % cream Apply 1 application topically as needed.    tiZANidine (ZANAFLEX) 2 MG tablet Take 1 tablet by mouth 2 (two) times a day.     Family History    None       Tobacco Use    Smoking status: Never Smoker    Smokeless tobacco: Never Used   Substance and Sexual Activity    Alcohol use: Never    Drug use: Never    Sexual activity: Not Currently     Review of Systems   Constitutional:  Negative for appetite change, chills and fever.   Respiratory:  Negative for cough, shortness of breath and wheezing.    Cardiovascular:  Negative for chest pain, palpitations and leg swelling.   Gastrointestinal:  Positive for diarrhea, nausea and vomiting. Negative for abdominal distention.   Genitourinary:  Negative for dysuria.   Skin:  Negative for rash.   Neurological:  Negative for dizziness, seizures and syncope.   Psychiatric/Behavioral:  Negative for agitation,  behavioral problems and confusion.    All other systems reviewed and are negative.  Objective:     Vital Signs (Most Recent):  Temp: 98.3 °F (36.8 °C) (02/22/22 2240)  Pulse: 80 (02/23/22 0012)  Resp: 18 (02/23/22 0012)  BP: 137/62 (02/23/22 0012)  SpO2: 100 % (02/23/22 0012) Vital Signs (24h Range):  Temp:  [98.3 °F (36.8 °C)] 98.3 °F (36.8 °C)  Pulse:  [78-82] 80  Resp:  [18-20] 18  SpO2:  [99 %-100 %] 100 %  BP: (137-154)/(60-71) 137/62     Weight: 89.7 kg (197 lb 12.8 oz)  Body mass index is 36.18 kg/m².    Physical Exam  Vitals reviewed.   Constitutional:       General: She is not in acute distress.     Appearance: Normal appearance.   HENT:      Head: Normocephalic and atraumatic.   Eyes:      General: No scleral icterus.     Extraocular Movements: Extraocular movements intact.      Conjunctiva/sclera: Conjunctivae normal.      Pupils: Pupils are equal, round, and reactive to light.   Cardiovascular:      Rate and Rhythm: Normal rate and regular rhythm.      Heart sounds: No murmur heard.    No friction rub. No gallop.   Pulmonary:      Effort: Pulmonary effort is normal. No respiratory distress.      Breath sounds: Normal breath sounds. No wheezing or rales.   Abdominal:      General: Abdomen is flat. Bowel sounds are normal. There is no distension.      Palpations: Abdomen is soft.      Tenderness: There is no abdominal tenderness. There is no guarding.      Comments: Obese, very mild TTP in epigastric area    Musculoskeletal:         General: No swelling.   Skin:     General: Skin is warm and dry.      Coloration: Skin is not jaundiced.      Findings: No rash.   Neurological:      General: No focal deficit present.      Mental Status: She is alert and oriented to person, place, and time.      Sensory: No sensory deficit.      Motor: No weakness.   Psychiatric:         Mood and Affect: Mood normal.         Thought Content: Thought content normal.         Judgment: Judgment normal.         CRANIAL NERVES      CN III, IV, VI   Pupils are equal, round, and reactive to light.     Significant Labs: All pertinent labs within the past 24 hours have been reviewed.  Recent Lab Results         02/23/22  1022   02/23/22  0722   02/23/22  0530   02/22/22  2231   02/22/22  2218        Influenza B, Molecular       Negative         Albumin/Globulin Ratio   0.8     0.8         Albumin   3.1     3.8         Alkaline Phosphatase   104     122         ALT   22     23         Amorphous, UA       Few         Anion Gap   15     17         Appearance, UA       Clear         AST   15     16         Bacteria, UA       Few         Baso #   0.10     0.07         Basophil %   1.2     0.8         Bilirubin (UA)       Small         BILIRUBIN TOTAL   0.3     0.6         BUN   22     28         BUN/CREAT RATIO   15     14         Calcium   8.9     9.1         Chloride   105     100         CO2   25     24         Color, UA       Yellow         COVID-19 Ag       Positive         Creatinine   1.47     1.98         Differential Type   Auto     Auto         eGFR if non    37     26         Eos #   0.32     0.34         Eosinophil %   3.7     4.1         Globulin, Total   4.1     4.6         Glucose   119     220         Glucose, UA       Negative         Hematocrit   33.4     37.5         Hemoglobin   11.2     12.5         Influenza A       Negative         Ketones, UA       15          Leukocytes, UA       Trace         Lipase       229         Lymph #   2.65     1.96         Lymph %   30.7     23.6         MCH   27.3     27.1         MCHC   33.5     33.3         MCV   81.3     81.2         Mono #   0.58     0.44         Mono %   6.7     5.3         MPV   10.4     10.4         Neutrophils, Abs   4.98     5.49         Neutrophils Relative   57.7     66.2         NITRITE UA       Negative         Occult Blood UA       Negative         pH, UA       5.5         Platelets   348     364         POC Glucose 180     124     192        Potassium   3.7     3.8         PROTEIN TOTAL   7.2     8.4         Protein, UA       Trace         RBC   4.11     4.62         RBC, UA       3-5         RDW   13.1     13.3         Sodium   141     137         Specific Proctor, UA       1.015         Squam Epithel, UA       Moderate         Troponin I High Sensitivity       10.0         UROBILINOGEN UA       0.2         WBC, UA       5-10         WBC   8.63     8.30                                Significant Imaging: I have reviewed all pertinent imaging results/findings within the past 24 hours.

## 2022-02-24 VITALS
TEMPERATURE: 98 F | HEART RATE: 75 BPM | HEIGHT: 62 IN | BODY MASS INDEX: 36.47 KG/M2 | DIASTOLIC BLOOD PRESSURE: 60 MMHG | SYSTOLIC BLOOD PRESSURE: 120 MMHG | WEIGHT: 198.19 LBS | OXYGEN SATURATION: 98 % | RESPIRATION RATE: 18 BRPM

## 2022-02-24 PROBLEM — R73.9 HYPERGLYCEMIA: Chronic | Status: RESOLVED | Noted: 2022-02-23 | Resolved: 2022-02-24

## 2022-02-24 PROBLEM — R11.2 NAUSEA & VOMITING: Status: RESOLVED | Noted: 2022-02-23 | Resolved: 2022-02-24

## 2022-02-24 PROBLEM — E86.0 DEHYDRATION: Status: RESOLVED | Noted: 2022-02-23 | Resolved: 2022-02-24

## 2022-02-24 PROBLEM — U07.1 COVID-19: Status: RESOLVED | Noted: 2022-02-23 | Resolved: 2022-02-24

## 2022-02-24 PROBLEM — N17.9 AKI (ACUTE KIDNEY INJURY): Status: RESOLVED | Noted: 2022-02-23 | Resolved: 2022-02-24

## 2022-02-24 LAB
ALBUMIN SERPL BCP-MCNC: 3 G/DL (ref 3.5–5)
ALBUMIN/GLOB SERPL: 0.8 {RATIO}
ALP SERPL-CCNC: 101 U/L (ref 55–142)
ALT SERPL W P-5'-P-CCNC: 21 U/L (ref 13–56)
ANION GAP SERPL CALCULATED.3IONS-SCNC: 15 MMOL/L (ref 7–16)
AST SERPL W P-5'-P-CCNC: 10 U/L (ref 15–37)
BASOPHILS # BLD AUTO: 0.06 K/UL (ref 0–0.2)
BASOPHILS NFR BLD AUTO: 0.9 % (ref 0–1)
BILIRUB SERPL-MCNC: 0.2 MG/DL (ref 0–1.2)
BUN SERPL-MCNC: 22 MG/DL (ref 7–18)
BUN/CREAT SERPL: 15 (ref 6–20)
CALCIUM SERPL-MCNC: 8.4 MG/DL (ref 8.5–10.1)
CHLORIDE SERPL-SCNC: 107 MMOL/L (ref 98–107)
CO2 SERPL-SCNC: 24 MMOL/L (ref 21–32)
CREAT SERPL-MCNC: 1.43 MG/DL (ref 0.55–1.02)
DIFFERENTIAL METHOD BLD: ABNORMAL
EOSINOPHIL # BLD AUTO: 0.45 K/UL (ref 0–0.5)
EOSINOPHIL NFR BLD AUTO: 7.1 % (ref 1–4)
ERYTHROCYTE [DISTWIDTH] IN BLOOD BY AUTOMATED COUNT: 13.4 % (ref 11.5–14.5)
GLOBULIN SER-MCNC: 3.9 G/DL (ref 2–4)
GLUCOSE SERPL-MCNC: 162 MG/DL (ref 70–105)
GLUCOSE SERPL-MCNC: 182 MG/DL (ref 74–106)
HCT VFR BLD AUTO: 34.1 % (ref 38–47)
HGB BLD-MCNC: 11.2 G/DL (ref 12–16)
LYMPHOCYTES # BLD AUTO: 2.62 K/UL (ref 1–4.8)
LYMPHOCYTES NFR BLD AUTO: 41.3 % (ref 27–41)
MCH RBC QN AUTO: 27.1 PG (ref 27–31)
MCHC RBC AUTO-ENTMCNC: 32.8 G/DL (ref 32–36)
MCV RBC AUTO: 82.6 FL (ref 80–96)
MONOCYTES # BLD AUTO: 0.55 K/UL (ref 0–0.8)
MONOCYTES NFR BLD AUTO: 8.7 % (ref 2–6)
MPC BLD CALC-MCNC: 10.9 FL (ref 9.4–12.4)
NEUTROPHILS # BLD AUTO: 2.66 K/UL (ref 1.8–7.7)
NEUTROPHILS NFR BLD AUTO: 42 % (ref 53–65)
PLATELET # BLD AUTO: 327 K/UL (ref 150–400)
POTASSIUM SERPL-SCNC: 3.8 MMOL/L (ref 3.5–5.1)
PROT SERPL-MCNC: 6.9 G/DL (ref 6.4–8.2)
RBC # BLD AUTO: 4.13 M/UL (ref 4.2–5.4)
SODIUM SERPL-SCNC: 142 MMOL/L (ref 136–145)
WBC # BLD AUTO: 6.34 K/UL (ref 4.5–11)

## 2022-02-24 PROCEDURE — 25000003 PHARM REV CODE 250: Performed by: HOSPITALIST

## 2022-02-24 PROCEDURE — 25000003 PHARM REV CODE 250: Performed by: NURSE PRACTITIONER

## 2022-02-24 PROCEDURE — 96361 HYDRATE IV INFUSION ADD-ON: CPT

## 2022-02-24 PROCEDURE — 99217 PR OBSERVATION CARE DISCHARGE: ICD-10-PCS | Mod: ,,, | Performed by: HOSPITALIST

## 2022-02-24 PROCEDURE — 36415 COLL VENOUS BLD VENIPUNCTURE: CPT | Performed by: NURSE PRACTITIONER

## 2022-02-24 PROCEDURE — 80053 COMPREHEN METABOLIC PANEL: CPT | Performed by: NURSE PRACTITIONER

## 2022-02-24 PROCEDURE — G0378 HOSPITAL OBSERVATION PER HR: HCPCS

## 2022-02-24 PROCEDURE — 82962 GLUCOSE BLOOD TEST: CPT

## 2022-02-24 PROCEDURE — 99217 PR OBSERVATION CARE DISCHARGE: CPT | Mod: ,,, | Performed by: HOSPITALIST

## 2022-02-24 PROCEDURE — 85025 COMPLETE CBC W/AUTO DIFF WBC: CPT | Performed by: NURSE PRACTITIONER

## 2022-02-24 RX ADMIN — GABAPENTIN 300 MG: 300 CAPSULE ORAL at 08:02

## 2022-02-24 RX ADMIN — LISINOPRIL 10 MG: 10 TABLET ORAL at 08:02

## 2022-02-24 RX ADMIN — PANTOPRAZOLE SODIUM 40 MG: 40 TABLET, DELAYED RELEASE ORAL at 08:02

## 2022-02-24 NOTE — HOSPITAL COURSE
Admit with COVID 19 N/V/D and was dehydrated with some renal failure.  She got IVFS, labs improved.  Tolerating PO.  Will DC home.  RTW on Monday.

## 2022-02-24 NOTE — DISCHARGE SUMMARY
[facility]  Hospital Medicine  Discharge Summary      Patient Name: Marianna Neal  MRN: [id number]  Patient Class: OP- Observation  Admission Date: 2/22/2022  Hospital Length of Stay: 0 days  Discharge Date and Time:  02/24/2022 9:13 AM  Attending Physician: Lee Mon DO   Discharging Provider: Lee Mon DO  Primary Care Provider: JAZMYN Horowitz FNP      HPI:   Ms Neal is a pleasant 71 yr old AAF who presents to the ED with c/o N/V/D for the past 6 days.  Reports occasional dry cough and occasional SOB.  Reports went to work tonight and felt worse than earlier today.  Was found to be positive for Covid, have dehydration with MO.  She is a known diabetic and BS was elevated.  She is being admitted for IV hydration and treatment of symptoms.  Feeling better this am, tolerating diet.       * No surgery found *      Hospital Course:   Admit with COVID 19 N/V/D and was dehydrated with some renal failure.  She got IVFS, labs improved.  Tolerating PO.  Will DC home.  RTW on Monday.        Goals of Care Treatment Preferences:  Code Status: Full Code      Consults:     No new Assessment & Plan notes have been filed under this hospital service since the last note was generated.  Service: Hospital Medicine    Final Active Diagnoses:    Diagnosis Date Noted POA    Essential hypertension [I10] 02/23/2022 Yes     Chronic      Problems Resolved During this Admission:    Diagnosis Date Noted Date Resolved POA    PRINCIPAL PROBLEM:  MO (acute kidney injury) [N17.9] 02/23/2022 02/24/2022 Yes    Dehydration [E86.0] 02/23/2022 02/24/2022 Yes    COVID-19 [U07.1] 02/23/2022 02/24/2022 Yes    Hyperglycemia [R73.9] 02/23/2022 02/24/2022 Yes     Chronic    Nausea & vomiting [R11.2] 02/23/2022 02/24/2022 Yes       Discharged Condition: good    Disposition: Home or Self Care    Follow Up:   Follow-up Information     JAZMYN Horowitz FNP Follow up in 2 week(s).    Specialty: Family  Medicine  Contact information:  [address] [facility]  [place] [address]  [phone]                       Patient Instructions:   No discharge procedures on file.    Significant Diagnostic Studies: Labs:   BMP:   Recent Labs   Lab 02/22/22 2231 02/23/22 0722   * 119*    141   K 3.8 3.7    105   CO2 24 25   BUN 28* 22*   CREATININE 1.98* 1.47*   CALCIUM 9.1 8.9    and CBC   Recent Labs   Lab 02/22/22 2231 02/23/22 0722 02/24/22 0739   WBC 8.30 8.63 6.34   HGB 12.5 11.2* 11.2*   HCT 37.5* 33.4* 34.1*    348 327       Pending Diagnostic Studies:     Procedure Component Value Units Date/Time    Comprehensive metabolic panel [[id number]] Collected: 02/24/22 0739    Order Status: Sent Lab Status: In process Updated: 02/24/22 0739    Specimen: Blood          Medications:  Reconciled Home Medications:      Medication List      CONTINUE taking these medications    cetirizine 10 MG tablet  Commonly known as: ZYRTEC  Take 1 tablet by mouth as needed.     gabapentin 300 MG capsule  Commonly known as: NEURONTIN  Take 600 mg by mouth 3 (three) times daily.     insulin aspart U-100 100 unit/mL (3 mL) Inpn pen  Commonly known as: NovoLOG     insulin degludec 200 unit/mL (3 mL) insulin pen  Commonly known as: TRESIBA FLEXTOUCH U-200  Inject 50 Units into the skin 2 (two) times a day.     lisinopriL-hydrochlorothiazide 10-12.5 mg per tablet  Commonly known as: PRINZIDE,ZESTORETIC  Take 1 tablet by mouth once daily.     omeprazole 20 MG capsule  Commonly known as: PRILOSEC  Take 1 capsule by mouth once daily.     polyethylene glycol 17 gram/dose powder  Commonly known as: GLYCOLAX  Take 17 g by mouth once daily.     SSD 1 % cream  Generic drug: silver sulfADIAZINE 1%  Apply 1 application topically as needed.     tiZANidine 2 MG tablet  Commonly known as: ZANAFLEX  Take 1 tablet by mouth 2 (two) times a day.            Indwelling Lines/Drains at time of discharge:    Lines/Drains/Airways     None                 Time spent on the discharge of patient: 24 minutes         Lee Mon DO  Department of Hospital Medicine  [facility]

## 2022-02-24 NOTE — DISCHARGE INSTRUCTIONS
Follow up with PCP in 2 weeks. Quarantine at home and return to work on Monday, February 28, 2022.

## 2022-02-28 ENCOUNTER — HOSPITAL ENCOUNTER (EMERGENCY)
Facility: HOSPITAL | Age: 72
Discharge: HOME | End: 2022-02-28
Payer: COMMERCIAL

## 2022-02-28 VITALS
OXYGEN SATURATION: 99 % | DIASTOLIC BLOOD PRESSURE: 69 MMHG | RESPIRATION RATE: 20 BRPM | WEIGHT: 206 LBS | HEIGHT: 63 IN | BODY MASS INDEX: 36.5 KG/M2 | SYSTOLIC BLOOD PRESSURE: 153 MMHG | TEMPERATURE: 99 F | HEART RATE: 79 BPM

## 2022-02-28 DIAGNOSIS — R11.2 NON-INTRACTABLE VOMITING WITH NAUSEA, UNSPECIFIED VOMITING TYPE: Primary | ICD-10-CM

## 2022-02-28 LAB
ALBUMIN SERPL BCP-MCNC: 3.3 G/DL (ref 3.5–5)
ALBUMIN/GLOB SERPL: 0.8 {RATIO}
ALP SERPL-CCNC: 106 U/L (ref 55–142)
ALT SERPL W P-5'-P-CCNC: 23 U/L (ref 13–56)
ANION GAP SERPL CALCULATED.3IONS-SCNC: 11 MMOL/L (ref 7–16)
AST SERPL W P-5'-P-CCNC: 13 U/L (ref 15–37)
BASOPHILS # BLD AUTO: 0.08 K/UL (ref 0–0.2)
BASOPHILS NFR BLD AUTO: 1.1 % (ref 0–1)
BILIRUB SERPL-MCNC: 0.5 MG/DL (ref 0–1.2)
BUN SERPL-MCNC: 12 MG/DL (ref 7–18)
BUN/CREAT SERPL: 9 (ref 6–20)
CALCIUM SERPL-MCNC: 8.6 MG/DL (ref 8.5–10.1)
CHLORIDE SERPL-SCNC: 104 MMOL/L (ref 98–107)
CO2 SERPL-SCNC: 28 MMOL/L (ref 21–32)
CREAT SERPL-MCNC: 1.3 MG/DL (ref 0.55–1.02)
DIFFERENTIAL METHOD BLD: ABNORMAL
EOSINOPHIL # BLD AUTO: 0.05 K/UL (ref 0–0.5)
EOSINOPHIL NFR BLD AUTO: 0.7 % (ref 1–4)
ERYTHROCYTE [DISTWIDTH] IN BLOOD BY AUTOMATED COUNT: 13.1 % (ref 11.5–14.5)
GLOBULIN SER-MCNC: 4.3 G/DL (ref 2–4)
GLUCOSE SERPL-MCNC: 201 MG/DL (ref 74–106)
HCT VFR BLD AUTO: 36.4 % (ref 38–47)
HGB BLD-MCNC: 12.4 G/DL (ref 12–16)
LYMPHOCYTES # BLD AUTO: 1.88 K/UL (ref 1–4.8)
LYMPHOCYTES NFR BLD AUTO: 25.6 % (ref 27–41)
MCH RBC QN AUTO: 27.3 PG (ref 27–31)
MCHC RBC AUTO-ENTMCNC: 34.1 G/DL (ref 32–36)
MCV RBC AUTO: 80.2 FL (ref 80–96)
MONOCYTES # BLD AUTO: 0.41 K/UL (ref 0–0.8)
MONOCYTES NFR BLD AUTO: 5.6 % (ref 2–6)
MPC BLD CALC-MCNC: 10.3 FL (ref 9.4–12.4)
NEUTROPHILS # BLD AUTO: 4.93 K/UL (ref 1.8–7.7)
NEUTROPHILS NFR BLD AUTO: 67 % (ref 53–65)
PLATELET # BLD AUTO: 336 K/UL (ref 150–400)
POTASSIUM SERPL-SCNC: 3.5 MMOL/L (ref 3.5–5.1)
PROT SERPL-MCNC: 7.6 G/DL (ref 6.4–8.2)
RBC # BLD AUTO: 4.54 M/UL (ref 4.2–5.4)
SODIUM SERPL-SCNC: 139 MMOL/L (ref 136–145)
WBC # BLD AUTO: 7.35 K/UL (ref 4.5–11)

## 2022-02-28 PROCEDURE — 36415 COLL VENOUS BLD VENIPUNCTURE: CPT | Performed by: NURSE PRACTITIONER

## 2022-02-28 PROCEDURE — 99283 PR EMERGENCY DEPT VISIT,LEVEL III: ICD-10-PCS | Mod: ,,, | Performed by: NURSE PRACTITIONER

## 2022-02-28 PROCEDURE — 85025 COMPLETE CBC W/AUTO DIFF WBC: CPT | Performed by: NURSE PRACTITIONER

## 2022-02-28 PROCEDURE — 80053 COMPREHEN METABOLIC PANEL: CPT | Performed by: NURSE PRACTITIONER

## 2022-02-28 PROCEDURE — 63600175 PHARM REV CODE 636 W HCPCS: Performed by: NURSE PRACTITIONER

## 2022-02-28 PROCEDURE — 99283 EMERGENCY DEPT VISIT LOW MDM: CPT | Mod: ,,, | Performed by: NURSE PRACTITIONER

## 2022-02-28 PROCEDURE — 96372 THER/PROPH/DIAG INJ SC/IM: CPT

## 2022-02-28 PROCEDURE — 99284 EMERGENCY DEPT VISIT MOD MDM: CPT

## 2022-02-28 RX ORDER — ONDANSETRON 2 MG/ML
4 INJECTION INTRAMUSCULAR; INTRAVENOUS
Status: DISCONTINUED | OUTPATIENT
Start: 2022-02-28 | End: 2022-02-28

## 2022-02-28 RX ORDER — PROMETHAZINE HYDROCHLORIDE 25 MG/1
25 SUPPOSITORY RECTAL EVERY 6 HOURS PRN
Qty: 10 SUPPOSITORY | Refills: 0 | Status: SHIPPED | OUTPATIENT
Start: 2022-02-28 | End: 2022-08-25

## 2022-02-28 RX ORDER — ONDANSETRON 2 MG/ML
4 INJECTION INTRAMUSCULAR; INTRAVENOUS
Status: COMPLETED | OUTPATIENT
Start: 2022-02-28 | End: 2022-02-28

## 2022-02-28 RX ORDER — ONDANSETRON 4 MG/1
4 TABLET, ORALLY DISINTEGRATING ORAL
Status: DISCONTINUED | OUTPATIENT
Start: 2022-02-28 | End: 2022-02-28

## 2022-02-28 RX ORDER — ONDANSETRON 4 MG/1
4 TABLET, ORALLY DISINTEGRATING ORAL EVERY 6 HOURS PRN
Qty: 15 TABLET | Refills: 0 | Status: SHIPPED | OUTPATIENT
Start: 2022-02-28 | End: 2022-08-25

## 2022-02-28 RX ADMIN — ONDANSETRON 4 MG: 2 INJECTION INTRAMUSCULAR; INTRAVENOUS at 03:02

## 2022-02-28 NOTE — ED TRIAGE NOTES
Pt states she had n/v last week and was dx with covid. Pt states she was admitted here for like 1.5 days for dehydration. Pt states she was released on Wednesday and on Friday morning the vomiting started back. Pt states she was not sent home with any meds for nausea. Pt states while in the hospital her n/v was under control. Pt states she was running a low grade temp yesterday. Pt denies any diarrhea. Pt states she had a mild nose bleed just pta to the ED this morning.

## 2022-02-28 NOTE — ED NOTES
Discharge instructions given and explained to pt and she verbalized her understanding. Pt states she is feeling better at this time. Pt's granddaughter will be driven pt home & awaiting her arrival.

## 2022-02-28 NOTE — ED PROVIDER NOTES
70 y/o AAF presenEncounter Date: 2/28/2022       History     Chief Complaint   Patient presents with    Vomiting    Nausea    Headache     70 y/o AAF presents pov with c/o n/v x 2 days. She denies diarrhea. Patient presented here at [facility] ED 6 days age with similar complaint and dx with covid. She was admitted for dehydration and MO. States nausea was controlled during hospital stay and patient was discharged home after a day and a half stay.She states 2 days ago she started having nausea again and dry heaving. States a subjective fever with temp of 99 at home. Patient scheduled to follow-up with her pcp Bernardo today.        Review of patient's allergies indicates:  No Known Allergies  Past Medical History:   Diagnosis Date    Diabetes mellitus     Hypertension      Past Surgical History:   Procedure Laterality Date    APPENDECTOMY      CHOLECYSTECTOMY      HERNIA REPAIR      HYSTERECTOMY       History reviewed. No pertinent family history.  Social History     Tobacco Use    Smoking status: Never Smoker    Smokeless tobacco: Never Used   Substance Use Topics    Alcohol use: Never    Drug use: Never     Review of Systems   Constitutional: Positive for fever. Negative for activity change, appetite change, chills, diaphoresis, fatigue and unexpected weight change.   HENT: Positive for nosebleeds. Negative for congestion, dental problem, drooling, ear discharge, ear pain, facial swelling, hearing loss, mouth sores, postnasal drip, rhinorrhea, sinus pressure, sinus pain, sneezing, sore throat, tinnitus, trouble swallowing and voice change.    Eyes: Negative.    Respiratory: Negative for apnea, cough, choking, chest tightness, shortness of breath, wheezing and stridor.    Cardiovascular: Negative for chest pain, palpitations and leg swelling.   Gastrointestinal: Positive for nausea and vomiting. Negative for abdominal distention, abdominal pain, anal bleeding, blood in stool, constipation, diarrhea and rectal  pain.   Endocrine: Negative.    Genitourinary: Negative.    Musculoskeletal: Negative.    Skin: Negative.    Neurological: Negative.    Hematological: Negative.    Psychiatric/Behavioral: Negative.        Physical Exam     Initial Vitals [02/28/22 0236]   BP Pulse Resp Temp SpO2   (!) 155/90 88 20 98.4 °F (36.9 °C) 99 %      MAP       --         Physical Exam    Constitutional: She appears well-developed and well-nourished. No distress.   HENT:   Head: Normocephalic.   Eyes: Conjunctivae and EOM are normal. Pupils are equal, round, and reactive to light.   Neck: Neck supple.   Normal range of motion.  Cardiovascular: Normal rate, regular rhythm, normal heart sounds and intact distal pulses. Exam reveals no gallop and no friction rub.    No murmur heard.  Pulmonary/Chest: Breath sounds normal. No respiratory distress. She has no wheezes. She has no rhonchi. She has no rales. She exhibits no tenderness.   Abdominal: Abdomen is soft. Bowel sounds are normal. She exhibits no distension and no mass. There is no abdominal tenderness. There is no rebound and no guarding.   Musculoskeletal:         General: Normal range of motion.      Cervical back: Normal range of motion and neck supple.     Neurological: She is alert and oriented to person, place, and time.   Skin: Skin is warm. Capillary refill takes less than 2 seconds.   Psychiatric: She has a normal mood and affect. Her behavior is normal. Judgment and thought content normal.         Medical Screening Exam   See Full Note    ED Course   Procedures  Labs Reviewed   COMPREHENSIVE METABOLIC PANEL - Abnormal; Notable for the following components:       Result Value    Glucose 201 (*)     Creatinine 1.30 (*)     Albumin 3.3 (*)     Globulin 4.3 (*)     AST 13 (*)     eGFR 43 (*)     All other components within normal limits   CBC WITH DIFFERENTIAL - Abnormal; Notable for the following components:    Hematocrit 36.4 (*)     Neutrophils % 67.0 (*)     Lymphocytes % 25.6 (*)      Eosinophils % 0.7 (*)     Basophils % 1.1 (*)     All other components within normal limits   CBC W/ AUTO DIFFERENTIAL    Narrative:     The following orders were created for panel order CBC auto differential.  Procedure                               Abnormality         Status                     ---------                               -----------         ------                     CBC with Differential[[id number]]        Abnormal            Final result                 Please view results for these tests on the individual orders.          Imaging Results    None          Medications   ondansetron injection 4 mg (4 mg Intramuscular Given 2/28/22 0332)                 ED Course as of 02/28/22 0349   Mon Feb 28, 2022   0333 Neutrophils, Abs: 4.93 [NJ]      ED Course User Index  [NJ] JAZMYN Bean          Clinical Impression:   Final diagnoses:  [R11.2] Non-intractable vomiting with nausea, unspecified vomiting type (Primary)          ED Disposition Condition    Discharge Stable        ED Prescriptions     Medication Sig Dispense Start Date End Date Auth. Provider    promethazine (PHENERGAN) 25 MG suppository Place 1 suppository (25 mg total) rectally every 6 (six) hours as needed for Nausea. 10 suppository 2/28/2022  JAZMYN Bean    ondansetron (ZOFRAN-ODT) 4 MG TbDL Take 1 tablet (4 mg total) by mouth every 6 (six) hours as needed (nausea/vomiting). 15 tablet 2/28/2022  JAZMYN Bean        Follow-up Information     Follow up With Specialties Details Why Contact Info    JAZMYN [facility] Go today for follow up [address] [facility] [id number] [phone]             JAZMYN Bean  02/28/22 0340       JAZMYN Bean  02/28/22 0343       JAZMYN Bean  02/28/22 0349

## 2022-02-28 NOTE — ED NOTES
Call grand daughter to inform her that pt is being discharged and she can return to pick her up. States she went home to [place], [place]. but she is fixing to leave to come back to get her. Awaiting her arrival. Informed pt of details.

## 2022-03-01 ENCOUNTER — OFFICE VISIT (OUTPATIENT)
Dept: FAMILY MEDICINE | Facility: CLINIC | Age: 72
End: 2022-03-01
Payer: COMMERCIAL

## 2022-03-01 VITALS
SYSTOLIC BLOOD PRESSURE: 129 MMHG | DIASTOLIC BLOOD PRESSURE: 63 MMHG | OXYGEN SATURATION: 99 % | BODY MASS INDEX: 37.44 KG/M2 | WEIGHT: 208 LBS | HEART RATE: 78 BPM

## 2022-03-01 DIAGNOSIS — R11.2 NAUSEA AND VOMITING, INTRACTABILITY OF VOMITING NOT SPECIFIED, UNSPECIFIED VOMITING TYPE: Primary | ICD-10-CM

## 2022-03-01 DIAGNOSIS — E08.42 DIABETIC POLYNEUROPATHY ASSOCIATED WITH DIABETES MELLITUS DUE TO UNDERLYING CONDITION: ICD-10-CM

## 2022-03-01 DIAGNOSIS — U07.1 CLINICAL DIAGNOSIS OF COVID-19: ICD-10-CM

## 2022-03-01 PROCEDURE — 3078F DIAST BP <80 MM HG: CPT | Mod: ,,, | Performed by: NURSE PRACTITIONER

## 2022-03-01 PROCEDURE — 99204 OFFICE O/P NEW MOD 45 MIN: CPT | Mod: 25,,, | Performed by: NURSE PRACTITIONER

## 2022-03-01 PROCEDURE — 4010F ACE/ARB THERAPY RXD/TAKEN: CPT | Mod: ,,, | Performed by: NURSE PRACTITIONER

## 2022-03-01 PROCEDURE — 3078F PR MOST RECENT DIASTOLIC BLOOD PRESSURE < 80 MM HG: ICD-10-PCS | Mod: ,,, | Performed by: NURSE PRACTITIONER

## 2022-03-01 PROCEDURE — 3074F PR MOST RECENT SYSTOLIC BLOOD PRESSURE < 130 MM HG: ICD-10-PCS | Mod: ,,, | Performed by: NURSE PRACTITIONER

## 2022-03-01 PROCEDURE — 3008F PR BODY MASS INDEX (BMI) DOCUMENTED: ICD-10-PCS | Mod: ,,, | Performed by: NURSE PRACTITIONER

## 2022-03-01 PROCEDURE — 4010F PR ACE/ARB THEARPY RXD/TAKEN: ICD-10-PCS | Mod: ,,, | Performed by: NURSE PRACTITIONER

## 2022-03-01 PROCEDURE — 3074F SYST BP LT 130 MM HG: CPT | Mod: ,,, | Performed by: NURSE PRACTITIONER

## 2022-03-01 PROCEDURE — 1159F PR MEDICATION LIST DOCUMENTED IN MEDICAL RECORD: ICD-10-PCS | Mod: ,,, | Performed by: NURSE PRACTITIONER

## 2022-03-01 PROCEDURE — 1159F MED LIST DOCD IN RCRD: CPT | Mod: ,,, | Performed by: NURSE PRACTITIONER

## 2022-03-01 PROCEDURE — 99204 PR OFFICE/OUTPT VISIT, NEW, LEVL IV, 45-59 MIN: ICD-10-PCS | Mod: 25,,, | Performed by: NURSE PRACTITIONER

## 2022-03-01 PROCEDURE — 96372 PR INJECTION,THERAP/PROPH/DIAG2ST, IM OR SUBCUT: ICD-10-PCS | Mod: ,,, | Performed by: NURSE PRACTITIONER

## 2022-03-01 PROCEDURE — 3008F BODY MASS INDEX DOCD: CPT | Mod: ,,, | Performed by: NURSE PRACTITIONER

## 2022-03-01 PROCEDURE — 96372 THER/PROPH/DIAG INJ SC/IM: CPT | Mod: ,,, | Performed by: NURSE PRACTITIONER

## 2022-03-01 RX ORDER — PEN NEEDLE, DIABETIC 32 GX 1/6"
NEEDLE, DISPOSABLE MISCELLANEOUS
COMMUNITY
Start: 2022-01-18

## 2022-03-01 RX ORDER — PRAVASTATIN SODIUM 20 MG/1
20 TABLET ORAL DAILY
COMMUNITY
Start: 2022-01-18

## 2022-03-01 RX ORDER — METOCLOPRAMIDE 10 MG/1
TABLET ORAL
COMMUNITY

## 2022-03-01 RX ORDER — PROMETHAZINE HYDROCHLORIDE 25 MG/ML
25 INJECTION, SOLUTION INTRAMUSCULAR; INTRAVENOUS
Status: COMPLETED | OUTPATIENT
Start: 2022-03-01 | End: 2022-03-01

## 2022-03-01 RX ORDER — GABAPENTIN 300 MG/1
600 CAPSULE ORAL 3 TIMES DAILY
Qty: 90 CAPSULE | Refills: 0 | Status: SHIPPED | OUTPATIENT
Start: 2022-03-01

## 2022-03-01 RX ORDER — DICYCLOMINE HYDROCHLORIDE 20 MG/1
TABLET ORAL
COMMUNITY

## 2022-03-01 RX ORDER — EMPAGLIFLOZIN, LINAGLIPTIN, METFORMIN HYDROCHLORIDE 10; 5; 1000 MG/1; MG/1; MG/1
TABLET, EXTENDED RELEASE ORAL
COMMUNITY
Start: 2022-01-19

## 2022-03-01 RX ORDER — MELOXICAM 7.5 MG/1
TABLET ORAL
COMMUNITY

## 2022-03-01 RX ORDER — LISINOPRIL 20 MG/1
TABLET ORAL
COMMUNITY

## 2022-03-01 RX ORDER — LISINOPRIL AND HYDROCHLOROTHIAZIDE 20; 25 MG/1; MG/1
1 TABLET ORAL DAILY
COMMUNITY
Start: 2022-01-18

## 2022-03-01 RX ORDER — INSULIN DETEMIR 100 [IU]/ML
INJECTION, SOLUTION SUBCUTANEOUS
COMMUNITY
Start: 2021-09-20

## 2022-03-01 RX ORDER — METHOCARBAMOL 500 MG/1
TABLET, FILM COATED ORAL
COMMUNITY

## 2022-03-01 RX ORDER — AMLODIPINE BESYLATE 10 MG/1
TABLET ORAL
COMMUNITY
Start: 2021-09-20

## 2022-03-01 RX ORDER — DICLOFENAC SODIUM 10 MG/G
GEL TOPICAL
COMMUNITY

## 2022-03-01 RX ORDER — SODIUM PICOSULFATE, MAGNESIUM OXIDE, AND ANHYDROUS CITRIC ACID 10; 3.5; 12 MG/160ML; G/160ML; G/160ML
LIQUID ORAL
COMMUNITY

## 2022-03-01 RX ADMIN — PROMETHAZINE HYDROCHLORIDE 25 MG: 25 INJECTION, SOLUTION INTRAMUSCULAR; INTRAVENOUS at 02:03

## 2022-03-01 NOTE — PROGRESS NOTES
JAZMYN Denis   [facility]  [address]   [place] [address]     PATIENT NAME: Marianna Neal  : 1950  DATE: 3/1/22  MRN: [id number]      Billing Provider: JAZMYN Denis  Level of Service:   Patient PCP Information     Provider PCP Type    JAZMYN Horowitz, JAZMYN General          Reason for Visit / Chief Complaint: Nausea       Update PCP  Update Chief Complaint         History of Present Illness / Problem Focused Workflow     Marianna Neal is a 71 y.o. female presents to the clinic as a new patient visit. She has history of HTN and DM and has appt with Dr Chang in [place] for her primary care.      She came in today after testing positive for covid one week ago and was hospitalized due to dehydration from vomiting. She was   Kept at [facility] x 2 days. She felt better initially and has been nauseous with dry heaves for the past few days and went back to ER 2 nigths ago and is taking Zofran and Phenergan that doesn't last long and still dry heaving.  She tried to eat mashed potatoes and soup but the potatoes came up later. She is keeping water down and has not tried any other gatorade..  No history of ulcers; she has had cholycytstectomy.  She has not had BM in one week since first getting diagnosed from covid and has tried  MOM, Miralax without BM.  She does relate she was having diarrhea prior to diagnosis of covid  Last A1c 9.?-- her glucose this am was 218. She has diabetic neuropathy and needs refill of gapapentin.    She is working at [employer] and has been there by 37 years with no intention of retiring anytime soon.  She was scheduled to go back to work last night but too sick to go,    Review of Systems     Review of Systems   Constitutional: Negative for fatigue.   HENT: Negative for nasal congestion and sore throat.    Respiratory: Negative for cough, chest tightness and shortness of breath.    Cardiovascular: Negative for chest pain, palpitations and leg swelling.   Gastrointestinal: Positive  "for abdominal pain, constipation, nausea and vomiting. Negative for reflux.   Neurological: Negative for weakness and memory loss.   Psychiatric/Behavioral: Negative for confusion and sleep disturbance.        Medical / Social / Family History     Past Medical History:   Diagnosis Date    Diabetes mellitus     Hypertension        Past Surgical History:   Procedure Laterality Date    APPENDECTOMY      CHOLECYSTECTOMY      HERNIA REPAIR      HYSTERECTOMY         Social History  Ms.  reports that she has never smoked. She has never used smokeless tobacco. She reports that she does not drink alcohol and does not use drugs.    Family History  Ms.'s family history includes Diabetes in her mother; Heart disease in her maternal grandmother and mother; Stroke in her father.    Medications and Allergies     Medications  Outpatient Medications Marked as Taking for the 3/1/22 encounter (Office Visit) with JAZMYN Denis   Medication Sig Dispense Refill    amLODIPine (NORVASC) 10 MG tablet       gabapentin (NEURONTIN) 300 MG capsule Take 600 mg by mouth 3 (three) times daily.      insulin aspart U-100 (NOVOLOG) 100 unit/mL (3 mL) InPn pen       insulin degludec (TRESIBA FLEXTOUCH U-200) 200 unit/mL (3 mL) insulin pen Inject 50 Units into the skin 2 (two) times a day.      lisinopriL-hydrochlorothiazide (PRINZIDE,ZESTORETIC) 20-25 mg Tab Take 1 tablet by mouth once daily.      meloxicam (MOBIC) 7.5 MG tablet meloxicam 7.5 mg tablet   TAKE 1 TABLET BY MOUTH ONCE DAILY AS NEEDED      NOVOFINE PLUS 32 gauge x 1/6" Ndle USE 4 PEN NEEDLES DAILY WITH INSULIN      omeprazole (PRILOSEC) 20 MG capsule Take 1 capsule by mouth once daily.      pravastatin (PRAVACHOL) 20 MG tablet Take 20 mg by mouth once daily.      promethazine (PHENERGAN) 25 MG suppository Place 1 suppository (25 mg total) rectally every 6 (six) hours as needed for Nausea. 10 suppository 0    silver sulfADIAZINE 1% (SILVADENE) 1 % cream Apply 1 " application topically as needed.         Allergies  Review of patient's allergies indicates:  No Known Allergies    Physical Examination     Vitals:    03/01/22 1303 03/01/22 1305   BP: (!) 146/53 129/63   BP Location: Right arm Left arm   Patient Position: Sitting Sitting   Pulse: 78    SpO2: 99%    Weight: 94.3 kg (208 lb)       Physical Exam  Constitutional:       Appearance: She is obese. She is ill-appearing.   Cardiovascular:      Rate and Rhythm: Normal rate and regular rhythm.   Pulmonary:      Effort: Pulmonary effort is normal.      Breath sounds: Normal breath sounds.   Abdominal:      Comments: Abdomen slightly distended with tenderness to palp throughout.   Musculoskeletal:      Right lower leg: No edema.      Left lower leg: No edema.   Skin:     General: Skin is warm and dry.   Neurological:      Mental Status: She is alert and oriented to person, place, and time.          Assessment and Plan (including Health Maintenance)      Problem List  Smart Sets  Document Outside HM   :    Plan:   Advised to increase her  Fluid intake and recommended pedialyte, sugar free gatorade, Soups, dry toast/crackers and gradually increase as tolerated.   Will continue with zofran or covid for nausea. Refill gabapentin x 30 days til she can get back to her PCP.  Work excuse to be off another week--2/28 thru  3/8/22 and she will follow up with Dr Chang on  3/3 and 3/7/22        Health Maintenance Due   Topic Date Due    Hepatitis C Screening  Never done    Lipid Panel  Never done    TETANUS VACCINE  Never done    Mammogram  Never done    DEXA Scan  Never done    Colorectal Cancer Screening  Never done    Shingles Vaccine (1 of 2) Never done    Pneumococcal Vaccines (Age 65+) (1 of 1 - PPSV23) Never done    Influenza Vaccine (1) Never done       Problem List Items Addressed This Visit    None       There are no diagnoses linked to this encounter.   The patient has no Health Maintenance topics of status Not  Due    Procedures     No future appointments.     No follow-ups on file.     Signature:  JAZMYN Denis    Date of encounter: 3/1/22

## 2022-03-01 NOTE — LETTER
March 1, 2022      [address] [address]  Phone: [phone]  Fax: [phone]       Patient: Marianna Neal   YOB: 1950  Date of Visit: 03/01/2022    To Whom It May Concern:    Gallo Neal  was at [facility] on 03/01/2022. The patient may return to work/school on 3/8/22  with no restrictions. If you have any questions or concerns, or if I can be of further assistance, please do not hesitate to contact me.    Sincerely,    JAZMYN Denis

## 2022-03-03 NOTE — PLAN OF CARE
Outpatient Therapy Discharge Summary     Name: Marianna Neal  [facility] Number: [id number]    Therapy Diagnosis:   Encounter Diagnoses   Name Primary?    Lumbar radiculopathy Yes    Degeneration of lumbar intervertebral disc      Physician: Raiza Perez MD    See EV CARRILLO IN MEDIA SECTION    Assessment    Goals:  Short Term Goals: 2 weeks   1) I HOME EXERCISE PROGRAM (MET)  2) decrease pain to 6/10 (MET)  3) patient tolerate 40 min of activity (progressing)     Long Term Goals: 4 weeks   1) Functional limitation 59/100% (progressing)  2) Decrease pain to 4/10 (progressing)  3) Normalize gait (progressing)  4) Normalize flexibility (progressing)    Discharge reason: Patient has completed the physician's prescription    Plan   This patient is discharged from Physical Therapy.

## 2025-07-09 ENCOUNTER — OFFICE VISIT (OUTPATIENT)
Dept: FAMILY MEDICINE | Facility: CLINIC | Age: 75
End: 2025-07-09
Payer: MEDICARE

## 2025-07-09 ENCOUNTER — PATIENT OUTREACH (OUTPATIENT)
Facility: HOSPITAL | Age: 75
End: 2025-07-09
Payer: MEDICARE

## 2025-07-09 DIAGNOSIS — E11.65 TYPE 2 DIABETES MELLITUS WITH HYPERGLYCEMIA, WITH LONG-TERM CURRENT USE OF INSULIN: ICD-10-CM

## 2025-07-09 DIAGNOSIS — E78.5 HYPERLIPIDEMIA, UNSPECIFIED HYPERLIPIDEMIA TYPE: ICD-10-CM

## 2025-07-09 DIAGNOSIS — I10 ESSENTIAL HYPERTENSION: Primary | ICD-10-CM

## 2025-07-09 DIAGNOSIS — R06.02 SHORTNESS OF BREATH ON EXERTION: ICD-10-CM

## 2025-07-09 DIAGNOSIS — E08.42 DIABETIC POLYNEUROPATHY ASSOCIATED WITH DIABETES MELLITUS DUE TO UNDERLYING CONDITION: ICD-10-CM

## 2025-07-09 DIAGNOSIS — Z79.4 TYPE 2 DIABETES MELLITUS WITH HYPERGLYCEMIA, WITH LONG-TERM CURRENT USE OF INSULIN: ICD-10-CM

## 2025-07-09 PROBLEM — U07.1 CLINICAL DIAGNOSIS OF COVID-19: Status: RESOLVED | Noted: 2022-02-23 | Resolved: 2025-07-09

## 2025-07-09 LAB
ALBUMIN SERPL BCP-MCNC: 3.1 G/DL (ref 3.4–4.8)
ALBUMIN/GLOB SERPL: 0.7 {RATIO}
ALP SERPL-CCNC: 131 U/L (ref 40–150)
ALT SERPL W P-5'-P-CCNC: 12 U/L
ANION GAP SERPL CALCULATED.3IONS-SCNC: 10 MMOL/L (ref 7–16)
AST SERPL W P-5'-P-CCNC: 27 U/L (ref 11–45)
BASOPHILS # BLD AUTO: 0.06 K/UL (ref 0–0.2)
BASOPHILS NFR BLD AUTO: 0.8 % (ref 0–1)
BILIRUB SERPL-MCNC: 0.4 MG/DL
BUN SERPL-MCNC: 15 MG/DL (ref 10–20)
BUN/CREAT SERPL: 11 (ref 6–20)
CALCIUM SERPL-MCNC: 9.2 MG/DL (ref 8.4–10.2)
CHLORIDE SERPL-SCNC: 111 MMOL/L (ref 98–107)
CHOLEST SERPL-MCNC: 193 MG/DL
CHOLEST/HDLC SERPL: 3.9 {RATIO}
CO2 SERPL-SCNC: 23 MMOL/L (ref 23–31)
CREAT SERPL-MCNC: 1.37 MG/DL (ref 0.55–1.02)
CREAT UR-MCNC: 103 MG/DL (ref 15–325)
DIFFERENTIAL METHOD BLD: ABNORMAL
EGFR (NO RACE VARIABLE) (RUSH/TITUS): 40 ML/MIN/1.73M2
EOSINOPHIL # BLD AUTO: 0.29 K/UL (ref 0–0.5)
EOSINOPHIL NFR BLD AUTO: 4 % (ref 1–4)
ERYTHROCYTE [DISTWIDTH] IN BLOOD BY AUTOMATED COUNT: 14 % (ref 11.5–14.5)
EST. AVERAGE GLUCOSE BLD GHB EST-MCNC: 169 MG/DL
GLOBULIN SER-MCNC: 4.2 G/DL (ref 2–4)
GLUCOSE SERPL-MCNC: 128 MG/DL (ref 82–115)
HBA1C MFR BLD HPLC: 7.5 %
HCT VFR BLD AUTO: 38.2 % (ref 38–47)
HDLC SERPL-MCNC: 49 MG/DL (ref 35–60)
HGB BLD-MCNC: 11.7 G/DL (ref 12–16)
IMM GRANULOCYTES # BLD AUTO: 0.02 K/UL (ref 0–0.04)
IMM GRANULOCYTES NFR BLD: 0.3 % (ref 0–0.4)
LDLC SERPL CALC-MCNC: 116 MG/DL
LDLC/HDLC SERPL: 2.4 {RATIO}
LYMPHOCYTES # BLD AUTO: 2.27 K/UL (ref 1–4.8)
LYMPHOCYTES NFR BLD AUTO: 31 % (ref 27–41)
MCH RBC QN AUTO: 25.7 PG (ref 27–31)
MCHC RBC AUTO-ENTMCNC: 30.6 G/DL (ref 32–36)
MCV RBC AUTO: 83.8 FL (ref 80–96)
MICROALBUMIN UR-MCNC: 98.9 MG/DL
MICROALBUMIN/CREAT RATIO PNL UR: 960.2 MG/G (ref 0–30)
MONOCYTES # BLD AUTO: 0.42 K/UL (ref 0–0.8)
MONOCYTES NFR BLD AUTO: 5.7 % (ref 2–6)
MPC BLD CALC-MCNC: 11 FL (ref 9.4–12.4)
NEUTROPHILS # BLD AUTO: 4.26 K/UL (ref 1.8–7.7)
NEUTROPHILS NFR BLD AUTO: 58.2 % (ref 53–65)
NONHDLC SERPL-MCNC: 144 MG/DL
NRBC # BLD AUTO: 0 X10E3/UL
NRBC, AUTO (.00): 0 %
PLATELET # BLD AUTO: 357 K/UL (ref 150–400)
POTASSIUM SERPL-SCNC: 4.1 MMOL/L (ref 3.5–5.1)
PROT SERPL-MCNC: 7.3 G/DL (ref 5.8–7.6)
RBC # BLD AUTO: 4.56 M/UL (ref 4.2–5.4)
SODIUM SERPL-SCNC: 140 MMOL/L (ref 136–145)
TRIGL SERPL-MCNC: 139 MG/DL (ref 37–140)
VLDLC SERPL-MCNC: 28 MG/DL
WBC # BLD AUTO: 7.32 K/UL (ref 4.5–11)

## 2025-07-09 PROCEDURE — 80053 COMPREHEN METABOLIC PANEL: CPT | Mod: ,,, | Performed by: CLINICAL MEDICAL LABORATORY

## 2025-07-09 PROCEDURE — 99204 OFFICE O/P NEW MOD 45 MIN: CPT | Mod: ,,,

## 2025-07-09 PROCEDURE — 3077F SYST BP >= 140 MM HG: CPT | Mod: ,,,

## 2025-07-09 PROCEDURE — 1125F AMNT PAIN NOTED PAIN PRSNT: CPT | Mod: ,,,

## 2025-07-09 PROCEDURE — 82043 UR ALBUMIN QUANTITATIVE: CPT | Mod: ,,, | Performed by: CLINICAL MEDICAL LABORATORY

## 2025-07-09 PROCEDURE — 82570 ASSAY OF URINE CREATININE: CPT | Mod: ,,, | Performed by: CLINICAL MEDICAL LABORATORY

## 2025-07-09 PROCEDURE — 3288F FALL RISK ASSESSMENT DOCD: CPT | Mod: ,,,

## 2025-07-09 PROCEDURE — 80061 LIPID PANEL: CPT | Mod: ,,, | Performed by: CLINICAL MEDICAL LABORATORY

## 2025-07-09 PROCEDURE — 1159F MED LIST DOCD IN RCRD: CPT | Mod: ,,,

## 2025-07-09 PROCEDURE — 1101F PT FALLS ASSESS-DOCD LE1/YR: CPT | Mod: ,,,

## 2025-07-09 PROCEDURE — 3079F DIAST BP 80-89 MM HG: CPT | Mod: ,,,

## 2025-07-09 RX ORDER — AMLODIPINE BESYLATE 5 MG/1
5 TABLET ORAL DAILY
Qty: 90 TABLET | Refills: 0 | Status: SHIPPED | OUTPATIENT
Start: 2025-07-09 | End: 2026-07-09

## 2025-07-09 RX ORDER — ONDANSETRON 4 MG/1
4 TABLET, FILM COATED ORAL EVERY 6 HOURS PRN
COMMUNITY

## 2025-07-09 RX ORDER — ACETAMINOPHEN, ASPIRIN (NSAID), AND CAFFEINE 250; 250; 65 MG/1; MG/1; MG/1
TABLET, FILM COATED ORAL
COMMUNITY
End: 2025-07-09

## 2025-07-09 RX ORDER — LISINOPRIL AND HYDROCHLOROTHIAZIDE 20; 25 MG/1; MG/1
1 TABLET ORAL DAILY
Qty: 90 TABLET | Refills: 0 | Status: SHIPPED | OUTPATIENT
Start: 2025-07-09

## 2025-07-09 RX ORDER — PRAVASTATIN SODIUM 40 MG/1
40 TABLET ORAL NIGHTLY
Qty: 90 TABLET | Refills: 0 | Status: SHIPPED | OUTPATIENT
Start: 2025-07-09

## 2025-07-09 RX ORDER — ALLOPURINOL 100 MG/1
100 TABLET ORAL DAILY
COMMUNITY
End: 2025-07-09

## 2025-07-09 RX ORDER — GABAPENTIN 600 MG/1
600 TABLET ORAL DAILY
COMMUNITY

## 2025-07-09 RX ORDER — PRAVASTATIN SODIUM 40 MG/1
40 TABLET ORAL NIGHTLY
COMMUNITY
End: 2025-07-09 | Stop reason: SDUPTHER

## 2025-07-09 NOTE — PROGRESS NOTES
EMILY Rebolledo   [facility]  [address]   [place] [address]     PATIENT NAME: Marianna Neal  : 1950  DATE: 25  MRN: [id number]      Billing Provider: EMILY Rebolledo  Level of Service:   Patient PCP Information       Provider PCP Type    Nirav Chang, JAZMYN, FNP General            Reason for Visit / Chief Complaint: Health Maintenance, Back Pain (Lower back pain x 2 years. Saw [facility] about 2 years ago. Saw Dr. Orion Cummings. Pt reports that the pain is an 8 when she is up walking but around a 2-4 pain level when sitting. Reports having surgery about 5 years ago on her lower back with a Dr Perez), Edema (Hand and feet pain.), and Medication Refill (Encouraged pt to bring all of her medications for the next visit.)         History of Present Illness / Problem Focused Workflow     Marianna Neal is a 74 y.o. female presents to the clinic for medication refills and reports swelling in her feet for about a month.    HPI:  Patient reports swelling in her feet for approximately 1 month or longer. She previously saw an MD at [facility] in [place], but recently moved back to the area.    Her blood pressure is noted to be very high during the visit. She did not take her blood pressure medication (lisinopril 20mg with hydrochlorothiazide 25mg) that morning but took it the previous day. She has about 2-3 days worth of medication left.    For diabetes management, she takes insulin daily: 40-50 units of long-acting insulin daily and 7 units of rapid-acting insulin as needed, typically with high-sugar foods. She checks her blood sugar at home, though not daily, reporting it usually runs between 125-140 mg/dL, depending on her diet.    She consumes large quantities of [place] tea and infrequently drinks water, though she has recently attempted to increase her water intake. She describes her diet as balanced with vegetables and meat, and states she does not frequently consume snacks  or sweets.    She reports shortness of breath with exertion and walking. She had back surgery in the past, which led to her custodial due to inability to be on her feet. She denies frequent shortness of breath at rest and chest pain.    She did not bring her medications or a list with her. States she uses [email or web address] pharmacy currently, but previously used [facility] in [place]. Nurse will call to obtain a medication list.      ROS:  General: -fever, -chills, -fatigue, -weight gain, -weight loss  Eyes: -vision changes, -redness, -discharge  ENT: -ear pain, -nasal congestion, -sore throat  Cardiovascular: -chest pain, -palpitations, -lower extremity edema  Respiratory: -cough, -shortness of breath, +exertional dyspnea  Gastrointestinal: -abdominal pain, -nausea, -vomiting, -diarrhea, -constipation, -blood in stool  Genitourinary: -dysuria, -hematuria, -frequency  Musculoskeletal: -joint pain, -muscle pain, +limb swelling  Skin: -rash, -lesion  Neurological: -headache, -dizziness, -numbness, -tingling  Psychiatric: -anxiety, -depression, -sleep difficulty            Medical / Social / Family History     Past Medical History:   Diagnosis Date    Diabetes mellitus     Hypertension        Past Surgical History:   Procedure Laterality Date    APPENDECTOMY      CHOLECYSTECTOMY      HERNIA REPAIR      HYSTERECTOMY         Social History  Ms.  reports that she has quit smoking. Her smoking use included cigarettes. She started smoking about 51 years ago. She has a 2.1 pack-year smoking history. She has never been exposed to tobacco smoke. She has never used smokeless tobacco. She reports that she does not drink alcohol and does not use drugs.    Family History  Ms.'s family history includes Diabetes in her mother; Heart disease in her maternal grandmother and mother; Stroke in her father.    Medications and Allergies     Medications  Outpatient Medications Marked as Taking for the 7/9/25 encounter (Office Visit) with Terrell  "EMILY Lucero   Medication Sig Dispense Refill    blood sugar diagnostic (CONTOUR NEXT TEST STRIPS MISC) USE 1 STRIP TO CHECK GLUCOSE 4 TIMES DAILY AND AS NEEDED      cetirizine (ZYRTEC) 10 MG tablet Take 1 tablet by mouth as needed.      gabapentin (NEURONTIN) 600 MG tablet Take 600 mg by mouth once daily. Pt reports that some days she takes two daily due to the pain      insulin aspart U-100 (NOVOLOG) 100 unit/mL (3 mL) InPn pen Inject 7 Units into the skin as needed.      insulin degludec (TRESIBA FLEXTOUCH U-200) 200 unit/mL (3 mL) insulin pen Inject 45 Units into the skin once daily.      linaCLOtide (LINZESS) 72 mcg Cap capsule Take 72 mcg by mouth before breakfast.      meloxicam (MOBIC) 7.5 MG tablet meloxicam 7.5 mg tablet   TAKE 1 TABLET BY MOUTH ONCE DAILY AS NEEDED      NOVOFINE PLUS 32 gauge x 1/6" Ndle USE 4 PEN NEEDLES DAILY WITH INSULIN      ondansetron (ZOFRAN) 4 MG tablet Take 4 mg by mouth every 6 (six) hours as needed.      pen needle, diabetic (NOVOFINE PLUS) 32 gauge x 1/6" Ndle USE 4 PEN NEEDLES DAILY WITH INSULIN      polyethylene glycol (GLYCOLAX) 17 gram/dose powder Take 17 g by mouth once daily.      [DISCONTINUED] pravastatin (PRAVACHOL) 40 MG tablet Take 40 mg by mouth every evening.         Allergies  Review of patient's allergies indicates:  No Known Allergies    Physical Examination     Vitals:    07/09/25 0947   BP: (!) 193/71  Comment: reports being out of BP meds x several days   Pulse: 76   Resp: 18   Temp: 98.1 °F (36.7 °C)   TempSrc: Oral   SpO2: 99%   Weight: 97.3 kg (214 lb 9.6 oz)   Height: 5' 2.5" (1.588 m)      Physical Exam  Vitals and nursing note reviewed.   Constitutional:       Appearance: She is obese.   HENT:      Head: Normocephalic and atraumatic.      Nose: Nose normal.   Cardiovascular:      Rate and Rhythm: Normal rate.      Pulses: Normal pulses.   Pulmonary:      Effort: Pulmonary effort is normal.      Breath sounds: No wheezing.   Musculoskeletal:      " Cervical back: Normal range of motion.      Right lower le+ Edema present.      Left lower le+ Edema present.   Skin:     General: Skin is warm and dry.   Neurological:      General: No focal deficit present.      Mental Status: She is alert. Mental status is at baseline.        Assessment and Plan (including Health Maintenance)     Assessment & Plan    IMPRESSION:  - BP elevated, likely due to medication noncompliance.    DIABETIC POLYNEUROPATHY ASSOCIATED WITH DIABETES MELLITUS DUE TO UNDERLYING CONDITION:  - Continue Gabapentin for neuropathy management.  - Patient currently takes insulin daily: 7 units of NovoLog as needed based on diet, and 40-50 units of another insulin daily.  - Blood glucose levels typically range between 125-140 depending on dietary intake.  - Discussed importance of dietary modifications to improve glycemic control, specifically advising reduction in juice, tea, and carbonated beverages.  - Will contact pharmacy to verify insulin order before sending prescription.    TYPE 2 DIABETES MELLITUS:  - Continue insulin regimen: Lantus (glargine) 40-50 units daily, NovoLog (aspart) 7 units as needed with meals.  - Discussed impact of diet on glycemic control, emphasizing need to reduce high-sugar beverages and increase water intake.    HYPERTENSION:  - Started Amlodipine (Norvasc) for inadequate blood pressure control and continued Lisinopril/HCTZ 20/25 mg daily.  - Blood pressure significantly elevated today, possibly due to medication non-adherence.  - Emphasized importance of consistent medication adherence to prevent complications such as stroke.  - Patient advised to maintain low-salt diet.    LOCALIZED EDEMA:  - Noted pedal edema persisting for approximately 1 month or longer.  - Advised patient to wear compression socks and keep feet elevated.    SHORTNESS OF BREATH:  - Referred patient to cardiology nurse practitioner at [facility] for evaluation of exertional dyspnea.  -  Patient experiences shortness of breath with ambulation and standing.  - Discussed importance of cardiology assessment for proper management.    HYPERLIPIDEMIA:  - Continue current lipid-lowering medication with nightly administration.  - Ordered labs to evaluate current cholesterol levels.    PERSONAL HISTORY:  - Patient has history of back surgery that led to skilled nursing due to mobility limitations.    FOLLOW-UP:  - Reviewed medication list to ensure all necessary refills are addressed.  - Requested medical records from [facility] for recent colonoscopy results.  - Continue Linzess daily for GI management.        RTC in 1 week for blood pressure recheck.    Health Maintenance Due   Topic Date Due    Hepatitis C Screening  Never done    TETANUS VACCINE  Never done    Colorectal Cancer Screening  Never done    Shingles Vaccine (1 of 2) Never done    COVID-19 Vaccine (4 - 2024-25 season) 09/01/2024    RSV Vaccine (Age 60+ and Pregnant patients) (1 - 1-dose 75+ series) Never done       Problem List Items Addressed This Visit          Neuro    Diabetic polyneuropathy associated with diabetes mellitus due to underlying condition       Cardiac/Vascular    Essential hypertension - Primary (Chronic)     Other Visit Diagnoses         Type 2 diabetes mellitus with hyperglycemia, with long-term current use of insulin          Hyperlipidemia, unspecified hyperlipidemia type            .  Health Maintenance Topics with due status: Not Due       Topic Last Completion Date    Influenza Vaccine 11/07/2017    Lipid Panel 08/22/2023    DEXA Scan 12/06/2023       Procedures     Future Appointments   Date Time Provider Department Center   7/16/2025  9:40 AM [facility] [place]        No follow-ups on file.     Signature:  EMILY Rebolledo    Date of encounter: 7/9/25    This note was generated with the assistance of ambient listening technology. Verbal consent was obtained by the patient and  accompanying visitor(s) for the recording of patient appointment to facilitate this note. I attest to having reviewed and edited the generated note for accuracy, though some syntax or spelling errors may persist. Please contact the author of this note for any clarification.

## 2025-07-09 NOTE — Clinical Note
Pt had a colonoscopy at [facility] 2024 and eye appt 2 months ago at [facility] in [place].

## 2025-07-09 NOTE — LETTER
AUTHORIZATION FOR RELEASE OF   CONFIDENTIAL INFORMATION    Dear Raya Le,    We are seeing Marianna Neal, date of birth 1950, in the clinic at [facility]. Nirav Chang FNP is the patient's PCP. Marianna Neal has an outstanding lab/procedure at the time we reviewed her chart. In order to help keep her health information updated, she has authorized us to request the following medical record(s):        (  )  MAMMOGRAM                                      ( X )  COLONOSCOPY      (  )  PAP SMEAR                                          (  )  OUTSIDE LAB RESULTS     (  )  DEXA SCAN                                          (  )  EYE EXAM            (  )  FOOT EXAM                                          (  )  ENTIRE RECORD     (  )  OUTSIDE IMMUNIZATIONS                 (  )  _______________         Please fax records to Ivett Phillips LPN Care Coordinator at [phone].       If you have any questions, please call [phone].          Patient Name: Marianna Neal  : 1950  Patient Phone #: [phone]            Marianna Neal  MRN: [id number]  : 1950  Age: 74 y.o.  Sex: female         Patient/Legal Guardian Signature  This signature was collected at 2025    pt       _______________________________   Printed Name/Relationship to Patient      Consent for Examination and Treatment: I hereby authorize the providers and employees of [facility] ([facility]) to provide medical treatment/services which includes, but is not limited to, performing and administering tests and diagnostic procedures that are deemed necessary, including, but not limited to, imaging examinations, blood tests and other laboratory procedures as may be required by the hospital, clinic, or may be ordered by my physician(s) or persons working under the general and/or special instructions of my physician(s).      I understand and agree that this consent covers all authorized persons, including but  not limited to physicians, residents, nurse practitioners, physicians' assistants, specialists, consultants, student nurses, and independently contracted physicians, who are called upon by the physician in charge, to carry out the diagnostic procedures and medical or surgical treatment.     I hereby authorize [facility] to retain or dispose of any specimens or tissue, should there be such remaining from any test or procedure.     I hereby authorize and give consent for [facility] providers and employees to take photographs, images or videotapes of such diagnostic, surgical or treatment procedures of Patient as may be required by [facility] or as may be ordered by a physician. I further acknowledge and agree that [facility] may use cameras or other devices for patient monitoring.     I am aware that the practice of medicine is not an exact science, and I acknowledge that no guarantees have been made to me as to the outcome of any tests, procedures or treatment.     Authorization for Release of Information: I understand that my insurance company and/or their agents may need information necessary to make determinations about payment/reimbursement. I hereby provide authorization to release to all insurance companies, their successors, assignees, other parties with whom they may have contracted, or others acting on their behalf, that are involved with payment for any hospital and/or clinic charges incurred by the patient, any information that they request and deem necessary for payment/reimbursement, and/or quality review.  I further authorize the release of my health information to physicians or other health care practitioners on staff who are involved in my health care now and in the future, and to other health care providers, entities, or institutions for the purpose of my continued care and treatment, including referrals.     REGISTRATION AUTHORIZATION  Form No. 48427 (Rev. 3/25/2024)    Page 1 of 3                        Medicare Patient's Certification and Authorization to Release Information and Payment Request:  I certify that the information given by me in applying for payment under Title XVIII of the Social Security Act is correct. I authorize any stewart of medical or other information about me to release to the Social SecurityAdministration, or its intermediaries or carriers, any information needed for this or a related Medicare claim. I request that payment of authorized benefits be made on my behalf.     Assignment of Insurance Benefits:   I hereby authorize any and all insurance companies, health plans, defined   benefit plans, health insurers or any entity that is or may be responsible for payment of my medical expenses to pay all hospital and medical benefits now due, and to become due and payable to me under any hospital benefits, sick benefits, injury benefits or any other benefit for services rendered to me, including Major Medical Benefits, direct to [facility] and all independently contracted physicians. I assign any and all rights that I may have against any and all insurance companies, health plans, defined benefit plans, health insurers or any entity that is or may be responsible for payment of my medical expenses, including, but not limited to any right to appeal a denial of a claim, any right to bring any action, lawsuit, administrative proceeding, or other cause of action on my behalf. I specifically assign my right to pursue litigation against any and all insurance companies, health plans, defined benefit plans, health insurers or any entity that is or may be responsible for payment of my medical expenses based upon a refusal to pay charges.            E. Valuables: It is understood and agreed that [facility] is not liable for the damage to or loss of any money, jewelry,   documents, dentures, eye glasses, hearing aids, prosthetics, or other property of value.     F. Computer Equipment: I understand and agree that  should I choose to use computer equipment owned by [facility] or if I choose to access the Internet via [facility]s network, I do so at my own risk. [facility] is not responsible for any damage to my computer equipment or to any damages of any type that might arise from my loss of equipment or data.     G. Acceptance of Financial Responsibility:  I agree that in consideration of the services and   supplies that have been   or will be furnished to the patient, I am hereby obligated to pay all charges made for or on the account of the patient according to the standard rates (in effect at the time the services and supplies are delivered) established by [facility], including its Patient Financial Assistance Policy to the extent it is applicable. I understand that I am responsible for all charges, or portions thereof, not covered by insurance or other sources. Patient refunds will be distributed only after balances at all [facility] facilities are paid.     H. Communication Authorization:  I hereby authorize [facility] and its representatives, along with any billing service   or  who may work on their behalf, to contact me on   my cell phone and/or home phone using pre- recorded messages, artificial voice messages, automatic telephone dialing devices or other computer assisted technology, or by electronic      mail, text messaging, or by any other form of electronic communication. This includes, but is not limited to, appointment reminders, yearly physical exam reminders, preventive care reminders, patient campaigns, welcome calls, and calls about account balances on my account or any account on which I am listed as a guarantor. I understand I have the right to opt out of these communications at any time.      Relationship  Between  Facility and  Provider:      I understand that some, but not all, providers furnishing services to the patient are not employees or agents of [facility]. The patient is under the care and  supervision of his/her attending physician, and it is the responsibility of the facility and its nursing staff to carry out the instructions of such physicians. It is the responsibility of the patient's physician/designee to obtain the patient's informed consent, when required, for medical or surgical treatment, special diagnostic or therapeutic procedures, or hospital services rendered for the patient under the special instructions of the physician/designee.           REGISTRATION AUTHORIZATION  Form No. 43848 (Rev. 3/25/2024)    Page 2 of 3                       Immunizations: [facility] shares immunization information with state sponsored health departments to help you and your doctor keep track of your immunization records. By signing, you consent to have this information shared with the health department in your state:                                [place] - LINKS ([place] Immunization Network for Kids Statewide)                                [place] - MIIX ([place] Immunization Information eXchange)                                [place] - ImmPRINT (Immunization Patient Registry with Integrated Technology)     TERM: This authorization is valid for this and subsequent care/treatment I receive at [facility] and will remain valid unless/until revoked in writing by me.     [facility]: As used in this document, [facility] means all [facility] owned and managed facilities, including, but not limited to, all health centers, surgery centers, clinics, urgent care centers, and hospitals.         [facility] complies with applicable Federal civil rights laws and does not discriminate on the basis of race, color, national origin, age, disability, or sex.  ATENCIÓN: si habla español, tiene a wilder disposición servicios gratuitos de asistencia lingüística. Blade giraldo [phone].  CHÚ Ý: N?u b?n nói Ti?ng Vi?t, có các d?ch v? h? tr? ngôn ng? mi?n phí dành cho b?n. G?i s? [phone].         REGISTRATION AUTHORIZATION  Form No. 11238 (Rev. 3/25/2024)   Page 3 of 3

## 2025-07-09 NOTE — PROGRESS NOTES
Population Health Chart Review & Patient Outreach Details        Health Maintenance Topics Addressed and Outreach Outcomes / Actions Taken:  Colon Cancer Screening  Diabetic Eye Exam [x] [facility] sent to [facility]   Salvador sent to [facility]

## 2025-07-09 NOTE — LETTER
AUTHORIZATION FOR RELEASE OF   CONFIDENTIAL INFORMATION    Dear [facility],    We are seeing Marianna Neal, date of birth 1950, in the clinic at [facility]. Nirav Chang FNP is the patient's PCP. Marianna Neal has an outstanding lab/procedure at the time we reviewed her chart. In order to help keep her health information updated, she has authorized us to request the following medical record(s):        (  )  MAMMOGRAM                                      (  )  COLONOSCOPY      (  )  PAP SMEAR                                          (  )  OUTSIDE LAB RESULTS     (  )  DEXA SCAN                                          ( X )  EYE EXAM            (  )  FOOT EXAM                                          (  )  ENTIRE RECORD     (  )  OUTSIDE IMMUNIZATIONS                 (  )  _______________         Please fax records to Ivett hPillips LPN Care Coordinator at [phone].       If you have any questions, please call [phone].          Patient Name: Marianna Neal  : 1950  Patient Phone #: [phone]            Marianna Neal  MRN: [id number]  : 1950  Age: 74 y.o.  Sex: female         Patient/Legal Guardian Signature  This signature was collected at 2025    pt       _______________________________   Printed Name/Relationship to Patient      Consent for Examination and Treatment: I hereby authorize the providers and employees of [facility] ([facility]) to provide medical treatment/services which includes, but is not limited to, performing and administering tests and diagnostic procedures that are deemed necessary, including, but not limited to, imaging examinations, blood tests and other laboratory procedures as may be required by the hospital, clinic, or may be ordered by my physician(s) or persons working under the general and/or special instructions of my physician(s).      I understand and agree that this consent covers all authorized persons, including  but not limited to physicians, residents, nurse practitioners, physicians' assistants, specialists, consultants, student nurses, and independently contracted physicians, who are called upon by the physician in charge, to carry out the diagnostic procedures and medical or surgical treatment.     I hereby authorize [facility] to retain or dispose of any specimens or tissue, should there be such remaining from any test or procedure.     I hereby authorize and give consent for [facility] providers and employees to take photographs, images or videotapes of such diagnostic, surgical or treatment procedures of Patient as may be required by [facility] or as may be ordered by a physician. I further acknowledge and agree that [facility] may use cameras or other devices for patient monitoring.     I am aware that the practice of medicine is not an exact science, and I acknowledge that no guarantees have been made to me as to the outcome of any tests, procedures or treatment.     Authorization for Release of Information: I understand that my insurance company and/or their agents may need information necessary to make determinations about payment/reimbursement. I hereby provide authorization to release to all insurance companies, their successors, assignees, other parties with whom they may have contracted, or others acting on their behalf, that are involved with payment for any hospital and/or clinic charges incurred by the patient, any information that they request and deem necessary for payment/reimbursement, and/or quality review.  I further authorize the release of my health information to physicians or other health care practitioners on staff who are involved in my health care now and in the future, and to other health care providers, entities, or institutions for the purpose of my continued care and treatment, including referrals.     REGISTRATION AUTHORIZATION  Form No. 00754 (Rev. 3/25/2024)    Page 1 of 3                        Medicare Patient's Certification and Authorization to Release Information and Payment Request:  I certify that the information given by me in applying for payment under Title XVIII of the Social Security Act is correct. I authorize any stewart of medical or other information about me to release to the Social SecurityAdministration, or its intermediaries or carriers, any information needed for this or a related Medicare claim. I request that payment of authorized benefits be made on my behalf.     Assignment of Insurance Benefits:   I hereby authorize any and all insurance companies, health plans, defined   benefit plans, health insurers or any entity that is or may be responsible for payment of my medical expenses to pay all hospital and medical benefits now due, and to become due and payable to me under any hospital benefits, sick benefits, injury benefits or any other benefit for services rendered to me, including Major Medical Benefits, direct to [facility] and all independently contracted physicians. I assign any and all rights that I may have against any and all insurance companies, health plans, defined benefit plans, health insurers or any entity that is or may be responsible for payment of my medical expenses, including, but not limited to any right to appeal a denial of a claim, any right to bring any action, lawsuit, administrative proceeding, or other cause of action on my behalf. I specifically assign my right to pursue litigation against any and all insurance companies, health plans, defined benefit plans, health insurers or any entity that is or may be responsible for payment of my medical expenses based upon a refusal to pay charges.            E. Valuables: It is understood and agreed that [facility] is not liable for the damage to or loss of any money, jewelry,   documents, dentures, eye glasses, hearing aids, prosthetics, or other property of value.     F. Computer Equipment: I understand and agree that  should I choose to use computer equipment owned by [facility] or if I choose to access the Internet via [facility]s network, I do so at my own risk. [facility] is not responsible for any damage to my computer equipment or to any damages of any type that might arise from my loss of equipment or data.     G. Acceptance of Financial Responsibility:  I agree that in consideration of the services and   supplies that have been   or will be furnished to the patient, I am hereby obligated to pay all charges made for or on the account of the patient according to the standard rates (in effect at the time the services and supplies are delivered) established by [facility], including its Patient Financial Assistance Policy to the extent it is applicable. I understand that I am responsible for all charges, or portions thereof, not covered by insurance or other sources. Patient refunds will be distributed only after balances at all [facility] facilities are paid.     H. Communication Authorization:  I hereby authorize [facility] and its representatives, along with any billing service   or  who may work on their behalf, to contact me on   my cell phone and/or home phone using pre- recorded messages, artificial voice messages, automatic telephone dialing devices or other computer assisted technology, or by electronic      mail, text messaging, or by any other form of electronic communication. This includes, but is not limited to, appointment reminders, yearly physical exam reminders, preventive care reminders, patient campaigns, welcome calls, and calls about account balances on my account or any account on which I am listed as a guarantor. I understand I have the right to opt out of these communications at any time.      Relationship  Between  Facility and  Provider:      I understand that some, but not all, providers furnishing services to the patient are not employees or agents of [facility]. The patient is under the care and  supervision of his/her attending physician, and it is the responsibility of the facility and its nursing staff to carry out the instructions of such physicians. It is the responsibility of the patient's physician/designee to obtain the patient's informed consent, when required, for medical or surgical treatment, special diagnostic or therapeutic procedures, or hospital services rendered for the patient under the special instructions of the physician/designee.           REGISTRATION AUTHORIZATION  Form No. 47326 (Rev. 3/25/2024)    Page 2 of 3                       Immunizations: [facility] shares immunization information with state sponsored health departments to help you and your doctor keep track of your immunization records. By signing, you consent to have this information shared with the health department in your state:                                [place] - LINKS ([place] Immunization Network for Kids Statewide)                                [place] - MIIX ([place] Immunization Information eXchange)                                [place] - ImmPRINT (Immunization Patient Registry with Integrated Technology)     TERM: This authorization is valid for this and subsequent care/treatment I receive at [facility] and will remain valid unless/until revoked in writing by me.     [facility]: As used in this document, [facility] means all [facility] owned and managed facilities, including, but not limited to, all health centers, surgery centers, clinics, urgent care centers, and hospitals.         [facility] complies with applicable Federal civil rights laws and does not discriminate on the basis of race, color, national origin, age, disability, or sex.  ATENCIÓN: si habla español, tiene a wilder disposición servicios gratuitos de asistencia lingüística. Blade giraldo [phone].  CHÚ Ý: N?u b?n nói Ti?ng Vi?t, có các d?ch v? h? tr? ngôn ng? mi?n phí dành cho b?n. G?i s? [phone].         REGISTRATION AUTHORIZATION  Form No. 72540 (Rev. 3/25/2024)   Page 3 of 3

## 2025-07-10 ENCOUNTER — RESULTS FOLLOW-UP (OUTPATIENT)
Dept: FAMILY MEDICINE | Facility: CLINIC | Age: 75
End: 2025-07-10
Payer: MEDICARE

## 2025-07-10 VITALS
WEIGHT: 214.63 LBS | SYSTOLIC BLOOD PRESSURE: 170 MMHG | TEMPERATURE: 98 F | RESPIRATION RATE: 18 BRPM | OXYGEN SATURATION: 99 % | HEIGHT: 63 IN | DIASTOLIC BLOOD PRESSURE: 80 MMHG | HEART RATE: 76 BPM | BODY MASS INDEX: 38.03 KG/M2

## 2025-07-10 DIAGNOSIS — E11.65 TYPE 2 DIABETES MELLITUS WITH HYPERGLYCEMIA, WITH LONG-TERM CURRENT USE OF INSULIN: Primary | ICD-10-CM

## 2025-07-10 DIAGNOSIS — I10 UNCONTROLLED HYPERTENSION: ICD-10-CM

## 2025-07-10 DIAGNOSIS — Z79.4 TYPE 2 DIABETES MELLITUS WITH HYPERGLYCEMIA, WITH LONG-TERM CURRENT USE OF INSULIN: Primary | ICD-10-CM

## 2025-07-10 DIAGNOSIS — N18.32 CKD STAGE 3B, GFR 30-44 ML/MIN: ICD-10-CM

## 2025-07-10 RX ORDER — INSULIN DEGLUDEC 200 U/ML
50 INJECTION, SOLUTION SUBCUTANEOUS DAILY
Qty: 22.5 ML | Refills: 1 | Status: SHIPPED | OUTPATIENT
Start: 2025-07-10

## 2025-07-16 ENCOUNTER — CLINICAL SUPPORT (OUTPATIENT)
Dept: FAMILY MEDICINE | Facility: CLINIC | Age: 75
End: 2025-07-16
Payer: MEDICARE

## 2025-07-16 VITALS — DIASTOLIC BLOOD PRESSURE: 66 MMHG | SYSTOLIC BLOOD PRESSURE: 154 MMHG | HEART RATE: 76 BPM

## 2025-07-16 DIAGNOSIS — I10 ESSENTIAL HYPERTENSION: Primary | ICD-10-CM

## 2025-07-16 RX ORDER — INSULIN ASPART 100 [IU]/ML
7 INJECTION, SOLUTION INTRAVENOUS; SUBCUTANEOUS
Qty: 5 EACH | Refills: 0 | Status: SHIPPED | OUTPATIENT
Start: 2025-07-16

## 2025-07-16 RX ORDER — GABAPENTIN 600 MG/1
600 TABLET ORAL 2 TIMES DAILY
Qty: 180 TABLET | Refills: 0 | Status: SHIPPED | OUTPATIENT
Start: 2025-07-16

## 2025-07-18 ENCOUNTER — TELEPHONE (OUTPATIENT)
Dept: FAMILY MEDICINE | Facility: CLINIC | Age: 75
End: 2025-07-18
Payer: MEDICARE

## 2025-07-18 RX ORDER — FUROSEMIDE 20 MG/1
20 TABLET ORAL DAILY PRN
Qty: 10 TABLET | Refills: 0 | Status: SHIPPED | OUTPATIENT
Start: 2025-07-18 | End: 2026-07-18

## 2025-07-18 NOTE — TELEPHONE ENCOUNTER
----- Message from Terra sent at 7/18/2025  8:27 AM CDT -----  Needs a medication for feet swelling called in to [facility] pharmacy

## 2025-07-21 ENCOUNTER — PATIENT OUTREACH (OUTPATIENT)
Facility: HOSPITAL | Age: 75
End: 2025-07-21
Payer: MEDICARE

## 2025-08-18 ENCOUNTER — OFFICE VISIT (OUTPATIENT)
Dept: FAMILY MEDICINE | Facility: CLINIC | Age: 75
End: 2025-08-18
Payer: MEDICARE

## 2025-08-18 VITALS
SYSTOLIC BLOOD PRESSURE: 138 MMHG | DIASTOLIC BLOOD PRESSURE: 70 MMHG | HEIGHT: 63 IN | RESPIRATION RATE: 18 BRPM | BODY MASS INDEX: 40.25 KG/M2 | WEIGHT: 227.19 LBS

## 2025-08-18 DIAGNOSIS — E66.813 CLASS 3 SEVERE OBESITY DUE TO EXCESS CALORIES WITH BODY MASS INDEX (BMI) OF 40.0 TO 44.9 IN ADULT, UNSPECIFIED WHETHER SERIOUS COMORBIDITY PRESENT: ICD-10-CM

## 2025-08-18 DIAGNOSIS — M51.27 LUMBAGO-SCIATICA DUE TO DISPLACEMENT OF LUMBAR INTERVERTEBRAL DISC: ICD-10-CM

## 2025-08-18 DIAGNOSIS — N18.32 CKD STAGE 3B, GFR 30-44 ML/MIN: ICD-10-CM

## 2025-08-18 DIAGNOSIS — I10 ESSENTIAL HYPERTENSION: Primary | ICD-10-CM

## 2025-08-18 DIAGNOSIS — M47.817 LUMBOSACRAL SPONDYLOSIS WITHOUT MYELOPATHY: ICD-10-CM

## 2025-08-18 PROBLEM — R11.2 NAUSEA AND VOMITING: Status: RESOLVED | Noted: 2022-02-23 | Resolved: 2025-08-18

## 2025-08-18 LAB
ANION GAP SERPL CALCULATED.3IONS-SCNC: 10 MMOL/L (ref 7–16)
BUN SERPL-MCNC: 26 MG/DL (ref 10–20)
BUN/CREAT SERPL: 14 (ref 6–20)
CALCIUM SERPL-MCNC: 8.9 MG/DL (ref 8.4–10.2)
CHLORIDE SERPL-SCNC: 106 MMOL/L (ref 98–107)
CO2 SERPL-SCNC: 26 MMOL/L (ref 23–31)
CREAT SERPL-MCNC: 1.89 MG/DL (ref 0.55–1.02)
CREAT UR-MCNC: 61 MG/DL (ref 15–325)
EGFR (NO RACE VARIABLE) (RUSH/TITUS): 27 ML/MIN/1.73M2
GLUCOSE SERPL-MCNC: 180 MG/DL (ref 82–115)
MICROALBUMIN UR-MCNC: 35.3 MG/DL
MICROALBUMIN/CREAT RATIO PNL UR: 578.7 MG/G (ref 0–30)
POTASSIUM SERPL-SCNC: 5 MMOL/L (ref 3.5–5.1)
SODIUM SERPL-SCNC: 137 MMOL/L (ref 136–145)

## 2025-08-18 PROCEDURE — 1159F MED LIST DOCD IN RCRD: CPT | Mod: ,,,

## 2025-08-18 PROCEDURE — 3051F HG A1C>EQUAL 7.0%<8.0%: CPT | Mod: ,,,

## 2025-08-18 PROCEDURE — 82043 UR ALBUMIN QUANTITATIVE: CPT | Mod: ,,, | Performed by: CLINICAL MEDICAL LABORATORY

## 2025-08-18 PROCEDURE — 1160F RVW MEDS BY RX/DR IN RCRD: CPT | Mod: ,,,

## 2025-08-18 PROCEDURE — 80048 BASIC METABOLIC PNL TOTAL CA: CPT | Mod: ,,, | Performed by: CLINICAL MEDICAL LABORATORY

## 2025-08-18 PROCEDURE — 99214 OFFICE O/P EST MOD 30 MIN: CPT | Mod: ,,,

## 2025-08-18 PROCEDURE — 3078F DIAST BP <80 MM HG: CPT | Mod: ,,,

## 2025-08-18 PROCEDURE — 82570 ASSAY OF URINE CREATININE: CPT | Mod: ,,, | Performed by: CLINICAL MEDICAL LABORATORY

## 2025-08-18 PROCEDURE — 1101F PT FALLS ASSESS-DOCD LE1/YR: CPT | Mod: ,,,

## 2025-08-18 PROCEDURE — 3288F FALL RISK ASSESSMENT DOCD: CPT | Mod: ,,,

## 2025-08-18 PROCEDURE — 3075F SYST BP GE 130 - 139MM HG: CPT | Mod: ,,,

## 2025-08-18 RX ORDER — LIDOCAINE 50 MG/G
1 PATCH TOPICAL DAILY
Qty: 30 PATCH | Refills: 3 | Status: SHIPPED | OUTPATIENT
Start: 2025-08-18

## 2025-08-26 ENCOUNTER — OFFICE VISIT (OUTPATIENT)
Dept: FAMILY MEDICINE | Facility: CLINIC | Age: 75
End: 2025-08-26
Payer: MEDICARE

## 2025-08-26 VITALS
HEIGHT: 63 IN | TEMPERATURE: 99 F | DIASTOLIC BLOOD PRESSURE: 85 MMHG | OXYGEN SATURATION: 99 % | RESPIRATION RATE: 24 BRPM | SYSTOLIC BLOOD PRESSURE: 116 MMHG | HEART RATE: 82 BPM | BODY MASS INDEX: 40.89 KG/M2

## 2025-08-26 DIAGNOSIS — K52.9 GASTROENTERITIS: ICD-10-CM

## 2025-08-26 DIAGNOSIS — N18.32 CKD STAGE 3B, GFR 30-44 ML/MIN: Chronic | ICD-10-CM

## 2025-08-26 DIAGNOSIS — I10 ESSENTIAL HYPERTENSION: Primary | Chronic | ICD-10-CM

## 2025-08-26 DIAGNOSIS — M51.27 LUMBAGO-SCIATICA DUE TO DISPLACEMENT OF LUMBAR INTERVERTEBRAL DISC: Chronic | ICD-10-CM

## 2025-08-26 LAB
ANION GAP SERPL CALCULATED.3IONS-SCNC: 12 MMOL/L (ref 7–16)
BUN SERPL-MCNC: 34 MG/DL (ref 10–20)
BUN/CREAT SERPL: 20 (ref 6–20)
CALCIUM SERPL-MCNC: 9.2 MG/DL (ref 8.4–10.2)
CHLORIDE SERPL-SCNC: 107 MMOL/L (ref 98–107)
CO2 SERPL-SCNC: 23 MMOL/L (ref 23–31)
CREAT SERPL-MCNC: 1.72 MG/DL (ref 0.55–1.02)
EGFR (NO RACE VARIABLE) (RUSH/TITUS): 31 ML/MIN/1.73M2
GLUCOSE SERPL-MCNC: 115 MG/DL (ref 82–115)
POTASSIUM SERPL-SCNC: 4.1 MMOL/L (ref 3.5–5.1)
SODIUM SERPL-SCNC: 138 MMOL/L (ref 136–145)

## 2025-08-26 PROCEDURE — 1159F MED LIST DOCD IN RCRD: CPT | Mod: ,,,

## 2025-08-26 PROCEDURE — 80048 BASIC METABOLIC PNL TOTAL CA: CPT | Mod: ,,, | Performed by: CLINICAL MEDICAL LABORATORY

## 2025-08-26 PROCEDURE — 1160F RVW MEDS BY RX/DR IN RCRD: CPT | Mod: ,,,

## 2025-08-26 PROCEDURE — 1101F PT FALLS ASSESS-DOCD LE1/YR: CPT | Mod: ,,,

## 2025-08-26 PROCEDURE — 1125F AMNT PAIN NOTED PAIN PRSNT: CPT | Mod: ,,,

## 2025-08-26 PROCEDURE — 99214 OFFICE O/P EST MOD 30 MIN: CPT | Mod: ,,,

## 2025-08-26 PROCEDURE — 3051F HG A1C>EQUAL 7.0%<8.0%: CPT | Mod: ,,,

## 2025-08-26 PROCEDURE — 3074F SYST BP LT 130 MM HG: CPT | Mod: ,,,

## 2025-08-26 PROCEDURE — 3079F DIAST BP 80-89 MM HG: CPT | Mod: ,,,

## 2025-08-26 PROCEDURE — 3288F FALL RISK ASSESSMENT DOCD: CPT | Mod: ,,,

## 2025-08-26 RX ORDER — ONDANSETRON 4 MG/1
4 TABLET, FILM COATED ORAL EVERY 6 HOURS PRN
Qty: 30 TABLET | Refills: 0 | Status: SHIPPED | OUTPATIENT
Start: 2025-08-26

## 2025-08-26 RX ORDER — LIDOCAINE 50 MG/G
1 PATCH TOPICAL DAILY
Qty: 30 PATCH | Refills: 3 | Status: SHIPPED | OUTPATIENT
Start: 2025-08-26